# Patient Record
Sex: MALE | Race: WHITE | URBAN - METROPOLITAN AREA
[De-identification: names, ages, dates, MRNs, and addresses within clinical notes are randomized per-mention and may not be internally consistent; named-entity substitution may affect disease eponyms.]

---

## 2017-05-03 ENCOUNTER — OFFICE VISIT (OUTPATIENT)
Dept: FAMILY MEDICINE | Facility: CLINIC | Age: 72
End: 2017-05-03
Payer: COMMERCIAL

## 2017-05-03 VITALS
SYSTOLIC BLOOD PRESSURE: 111 MMHG | HEIGHT: 63 IN | DIASTOLIC BLOOD PRESSURE: 69 MMHG | BODY MASS INDEX: 28.93 KG/M2 | TEMPERATURE: 98.1 F | OXYGEN SATURATION: 96 % | HEART RATE: 56 BPM | WEIGHT: 163.3 LBS

## 2017-05-03 DIAGNOSIS — F03.90 DEMENTIA WITHOUT BEHAVIORAL DISTURBANCE, UNSPECIFIED DEMENTIA TYPE: Primary | ICD-10-CM

## 2017-05-03 PROCEDURE — 99214 OFFICE O/P EST MOD 30 MIN: CPT | Performed by: INTERNAL MEDICINE

## 2017-05-03 NOTE — NURSING NOTE
"Chief Complaint   Patient presents with     Memory Loss       Initial /69 (BP Location: Right arm, Patient Position: Chair, Cuff Size: Adult Regular)  Pulse 56  Temp 98.1  F (36.7  C) (Oral)  Ht 5' 2.5\" (1.588 m)  Wt 163 lb 4.8 oz (74.1 kg)  SpO2 96%  BMI 29.39 kg/m2 Estimated body mass index is 29.39 kg/(m^2) as calculated from the following:    Height as of this encounter: 5' 2.5\" (1.588 m).    Weight as of this encounter: 163 lb 4.8 oz (74.1 kg).  Medication Reconciliation: complete   Whiltey Oconnell MA  "

## 2017-05-03 NOTE — PATIENT INSTRUCTIONS
Consider speech therapy appointment - I will contact you on myRetehart  Follow up with me in about 6 months

## 2017-05-03 NOTE — MR AVS SNAPSHOT
After Visit Summary   5/3/2017    Micheal Campa    MRN: [id number]           Patient Information     Date Of Birth          1945        Visit Information        Provider Department      5/3/2017 3:30 PM Kady Chavez, DO [facility]        Today's Diagnoses     Dementia without behavioral disturbance, unspecified dementia type    -  1      Care Instructions    Consider speech therapy appointment - I will contact you on MyChart  Follow up with me in about 6 months        Follow-ups after your visit        Additional Services     SPEECH THERAPY REFERRAL       *This therapy referral will be filtered to a centralized scheduling office at [facility] and the patient will receive a call to schedule an appointment at a [facility] location most convenient for them. *     [facility] provides Speech Therapy evaluation and treatment and many specialty services across the [facility] system.  If requesting a specialty program, please choose from the list below.  If you have not heard from the scheduling office within 2 business days, please call [phone] for all locations, with the exception of [place], please call [phone].       Treatment: Evaluation & Treatment  Speech Treatment Diagnosis: Cognitive Deficits and word-finding difficulty  Special Instructions: Help with word finding  Special Programs: None    Please be aware that coverage of these services is subject to the terms and limitations of your health insurance plan.  Call member services at your health plan with any benefit or coverage questions.      **Note to Provider:  If you are referring outside of [facility] for the therapy appointment, please list the name of the location in the  special instructions  above, print the referral and give to the patient to schedule the appointment.                  Who to contact     If you have questions or need follow up information about today's clinic  "visit or your schedule please contact [facility] directly at [phone].  Normal or non-critical lab and imaging results will be communicated to you by MyChart, letter or phone within 4 business days after the clinic has received the results. If you do not hear from us within 7 days, please contact the clinic through [employer] or phone. If you have a critical or abnormal lab result, we will notify you by phone as soon as possible.  Submit refill requests through [employer] or call your pharmacy and they will forward the refill request to us. Please allow 3 business days for your refill to be completed.          Additional Information About Your Visit        [employer] Information     [employer] gives you secure access to your electronic health record. If you see a primary care provider, you can also send messages to your care team and make appointments. If you have questions, please call your primary care clinic.  If you do not have a primary care provider, please call [phone] and they will assist you.        Care EveryWhere ID     This is your Care EveryWhere ID. This could be used by other organizations to access your [facility] medical records  [id number]        Your Vitals Were     Pulse Temperature Height Pulse Oximetry BMI (Body Mass Index)       56 98.1  F (36.7  C) (Oral) 5' 2.5\" (1.588 m) 96% 29.39 kg/m2        Blood Pressure from Last 3 Encounters:   05/03/17 111/69   11/16/16 118/71   06/28/16 122/80    Weight from Last 3 Encounters:   05/03/17 163 lb 4.8 oz (74.1 kg)   11/16/16 163 lb (73.9 kg)   06/28/16 158 lb 12.8 oz (72 kg)              We Performed the Following     SPEECH THERAPY REFERRAL        Primary Care Provider Office Phone # Fax #    Christopher Ramirez [phone]       [facility] [address] [place] [id number]        Thank you!     Thank you for choosing [facility]  for your care. Our goal is always to provide you with excellent care. " Hearing back from our patients is one way we can continue to improve our services. Please take a few minutes to complete the written survey that you may receive in the mail after your visit with us. Thank you!             Your Updated Medication List - Protect others around you: Learn how to safely use, store and throw away your medicines at www.disposemymeds.org.          This list is accurate as of: 5/3/17  4:12 PM.  Always use your most recent med list.                   Brand Name Dispense Instructions for use    aspirin 81 MG tablet      1 TABLET DAILY       cholecalciferol 2000 UNITS tablet      Take 1 tablet by mouth daily.       cyabnocobalamin 2500 MCG sublingual tablet    VITAMIN B-12    90 tablet    Take 2,500 mcg by mouth daily.       donepezil 10 MG tablet    ARICEPT    90 tablet    Take 1 tablet (10 mg) by mouth At Bedtime       fish oil-omega-3 fatty acids 1000 MG capsule     180 capsule    Take 2 capsules by mouth daily.       memantine 10 MG tablet    NAMENDA    180 tablet    Take 1 tablet (10 mg) by mouth 2 times daily       pravastatin 40 MG tablet    PRAVACHOL    90 tablet    Take 1 tablet (40 mg) by mouth daily

## 2017-05-03 NOTE — PROGRESS NOTES
"MEMORY EVALUATION CLINIC - FOLLOWUP    INTERVAL HISTORY: Mino is here for f/u of his early onset dementia along with his wife Shu. Over the past 6 months things have been pretty stable. Currently on full dose Aricept and Namenda. Shu remarks that Mino is doing very well physically but she has concerns about increasing cognitive impairment. One night several months ago Mino did not recognize Shu and he told her that he wanted to go find his wife and grew somewhat upset about this. They had been in [place] and were driving home at night when this occurred so likely the unfamiliarity of it plus it being dusk might have worsened the situation--there has been no reoccurrences of this. Shu was able to redirect him and calm him by telling him \"I'll help you go find your wife\".     Joined [employer], they both really enjoy singing -- May 13 they have a concert planned. Shu is concerned that Mino is having some difficulty with word finding. Shu notices a very slow decline where he sometimes has to ask more questions and gets more confused as to who people are. Mino is still sleeping well, he does wake once during the night to use the restroom. No recent episodes of wandering--alarm on front door. Shu takes an hour to herself at night when Mino goes to sleep at 10PM and she stays up until 11PM and that works well for her to decompress. Their kids are also closely involved.    They have a trip planned for June to go to [facility] to see Mino's family -- he is excited to see his family. They will go with two sons and they are prepared in case he gets confused with new surroundings and time zones.    When you ask Mino about how things are going he just mentions he is excited for the trip and does not add much else. Shu is beginning to feel that she is having trouble finding things for Mino to do at home. She tries to get him out walking more as the weather improves. Shu will " "need assistance organizing paperwork for her son's FMLA paperwork to be able to get time off to be able to care for Mino.      ADLs: moderate assist with personal cares  Driving: None  Finances: Brandi manages finances  Shopping/housekeeping/meal prep: Brandi manages meal prep  Medications: tolerating full dose Aricept and Namenda well  Home situation: Condo home with spouse  Support: good social and family support, excellent care from spouse and children   Behaviors/Hallucinations/Delusions: None, though he has wandered in the past; now has alarm on front door      REVIEW OF SYSTEMS: Detailed as above     This document serves as a record of the services and decisions personally performed and made by Kady Chavez DO. It was created on his/her behalf by Tracie Rosas, a trained medical scribe. The creation of this document is based the provider's statements to the medical scribe.  Scribrupinder Rosas 4:12 PM, May 3, 2017    OBJECTIVE: /69 (BP Location: Right arm, Patient Position: Chair, Cuff Size: Adult Regular)  Pulse 56  Temp 98.1  F (36.7  C) (Oral)  Ht 5' 2.5\" (1.588 m)  Wt 163 lb 4.8 oz (74.1 kg)  SpO2 96%  BMI 29.39 kg/m2  Pleasant  Clean and groomed  Ambulates independently  Looks to his wife for answers because his answers are quite vague  He is thankful and appreciative and has sweet personality     ASSESSMENT/PLAN:   Mino is here in follow up of his dementia likely of the Alzheimer's type of early onset.    Family is very educated about dementia and very proactive. Brandi was able to re-direct Mino recently when he didn't realize that she was his wife. Emotionally Brandi is holding ok. Answered a lot of questions that his wife had. Guidance provided on upcoming trip. May benefit on speech therapy for word finding difficulty so referral was placed. Continue medications as is for added stability.     Patient Instructions   Consider speech therapy appointment - I will contact you on " Rehana  Follow up with me in about 6 months    MDM: 30 minutes spent with patient, over 50% time counseling, coordinating care and explaining about nature of the patient's conditions.    The information in this document, created by the medical scribe for me, accurately reflects the services I personally performed and the decisions made by me. I have reviewed and approved this document for accuracy prior to leaving the patient care area.  Kady Chavez DO  4:12 PM, 05/03/17    Kady Chavez DO  Internal Medicine and Geriatrics  [facility]

## 2017-05-15 ENCOUNTER — HOSPITAL ENCOUNTER (OUTPATIENT)
Dept: SPEECH THERAPY | Facility: CLINIC | Age: 72
Setting detail: THERAPIES SERIES
End: 2017-05-15
Attending: INTERNAL MEDICINE
Payer: COMMERCIAL

## 2017-05-15 PROCEDURE — 40000211 ZZHC STATISTIC SLP  DEPARTMENT VISIT: Performed by: SPEECH-LANGUAGE PATHOLOGIST

## 2017-05-15 PROCEDURE — G9162 LANG EXPRESS CURRENT STATUS: HCPCS | Mod: GN,CL | Performed by: SPEECH-LANGUAGE PATHOLOGIST

## 2017-05-15 PROCEDURE — 92523 SPEECH SOUND LANG COMPREHEN: CPT | Mod: GN,52 | Performed by: SPEECH-LANGUAGE PATHOLOGIST

## 2017-05-15 PROCEDURE — G9163 LANG EXPRESS GOAL STATUS: HCPCS | Mod: GN,CL | Performed by: SPEECH-LANGUAGE PATHOLOGIST

## 2017-05-15 NOTE — PROGRESS NOTES
[facility]          OUTPATIENT SPEECH LANGUAGE PATHOLOGY LANGUAGE-COGNITION  EVALUATION  PLAN OF TREATMENT FOR OUTPATIENT REHABILITATION  (COMPLETE FOR INITIAL CLAIMS ONLY)  Patient's Last Name, First Name, M.I.  YOB: 1945  LokeshMicheal  SHELLY                        Provider s Name: [facility] Medical Record No.  [id number]     Onset Date:  05/03/17 (Order date, diagnosed 2011)   Start of Care Date: 05/15/17   Type:     ___PT  __OT   _X_SLP    Medical Diagnosis:  Dementia without behavioral disturbance, unspecified dementia type (F03.90)   Speech Language Pathology Diagnosis:  Moderate-severe expressive and receptive language impairment    Visits from SOC: 1                                        ________________________________________________________________________________  Plan of Treatment/Functional Goals:   Planned Therapy Interventions: Language        Language / Cognition Goals  1.         Goal Description: Patient and wife will demonstrate use of cueing strategies to support verbal expression with min assist across 4/5 communication breakdowns to improve patient's verbal expression for everyday speaking tasks.       Target Date: 08/03/17   2.         Goal Description: Patient and family will initiate home program to address declining language skills and enhance patient's communication environment with min assist.        Target Date: 08/03/17   3.         Goal Description: Patient will utilize a communication support tool (e.g. binder or booklet) to answer questions and comment on a topic of his choice (e.g. shopping, family, vacation) in 4/5 opportunities when given mod assist.        Target Date: 08/03/17            Predicted Duration of Therapy Intervention (days/wks): 6-8 weeks    Marcia Jesus, SLP       I CERTIFY THE NEED FOR THESE SERVICES FURNISHED UNDER         THIS PLAN OF TREATMENT AND WHILE UNDER MY CARE     (Physician co-signature of this document indicates review and certification of the therapy plan).                  Certification Date From:  05/15/17  Certification Date To:   08/03/17          Referring Physician:  Dr. Kady Chavez    Initial Assessment        See Epic Evaluation Start Of Care Date: 05/15/17

## 2017-05-15 NOTE — PROGRESS NOTES
[facility] Outpatient Speech-Language Evaluation     05/15/17 7605   General Information   Type of Evaluation Speech and Language   Type Of Visit Initial   Start Of Care Date 05/15/17   Referring Physician Dr. Kady Chavez   Orders Evaluate And Treat   Orders Comment Cognitive deficits and word-finding difficulty, help with word-finding   Medical Diagnosis Dementia without behavioral disturbance, unspecified dementia type (F03.90)   Onset Of Illness/injury Or Date Of Surgery 05/03/17  (Order date, diagnosed 2011)   Surgical/Medical history reviewed Yes   Pertinent History Of Current Problem Mr. Campa is a pleasant 71-year-old male patient with daignosis of dementia in 2011 referred to SLP services after family c/o increased difficulty with word-finding in daily conversation. Patient has never had formal speech therapy services before.     Current Community Support  Family/friend caregiver   Patient Role/employment History Retired  (former employee of [employer])   Living environment Apartment/condo   General Observations Patient able to answer some questions independently, but information was not accurate (per wife).    Patient/family Goals To improve word-finding and conversation ability   Oral Motor Sensory Function   Comments Did not assess.   Language: Auditory Comprehension (understanding of spoken language)   Tests were administered at the following levels Moderate (routine daily activities)   Two Step Commands (out of 5 total) 3   Functional Assessment Scale (Auditory Comprehension) Moderate to Severe Impairment   Comments (Auditory Comprehension) Reduced ability to follow 1 and 2 step commands, memory impairment likely contributing at longer listening task level.    Language: Verbal Expression (use of spoken language to express information)   Tests were administered at the following levels Basic (rote activities)   [place] Naming Test, short form (out of 15 total) 2   Generative Naming Score;  Cognitive Linguistic Quick Test 2   Generative Naming; Cognitive Linguistic Quick Test Result Below mean   Functional Assessment Scale (Verbal Expression) Moderate to Severe Impairment   Comments (Verbal Expression) Verbal expression is reduced in complexity, key nouns missing, speech is vague and brief in utterance   Reading Comprehension (understanding of written language)   Tests were administered at the following levels Basic (rote activities)   Picture - Word Match; [place] Diagnostic Aphasia Exam 3 (out of 10 total) 5   Simple Phrase/Sentence (out of 15 total) 2   Functional Assessment Scale (Reading Comprehension) Moderate to Severe Impairment   Comments (Reading Comprehension) Patient able to read at around 50% accy the single word level   Written Expression (use of writing to express information)   Tests were administered at the following levels Basic (rote activities)   Functional Assessment Scale (Written Expression) Moderate to Severe Impairment   Comments (Written Expression) Patient wrote his name with spelling errors, unable to write the alphabet, was able to write 5/5 alphabet letters from verbal cues, 3/5 numbers from verbal cues, and 0/5 simple words from verbal cues.    Cognitive Status Examination   Short Term Memory impaired   Cognitive Status Exam Comments did not assess cognition. Pt has history of dementia since 2011.    Education Assessment   Barriers to Learning Cognitive   Preferred Learning Style Listening;Reading;Demonstration;Pictures/video   General Therapy Interventions   Planned Therapy Interventions Language   Language Verbal expression;Auditory comprehension   Clinical Impression, SLP Eval   Criteria for Skilled Therapeutic Interventions Met yes;treatment indicated   SLP Diagnosis Moderate-severe expressive and receptive language impairment   Functional limitations due to impairments Patient unable to identify objects and find things in his home with verbal prompts alone, unable to  fully participate in conversation with family and friends   Rehab potential affected by progressive diagnosis   Therapy Frequency 1 time;per week   Predicted Duration of Therapy Intervention (days/wks) 6 weeks   Risks and Benefits of Treatment have been explained. Yes   Patient, Family & other staff in agreement with plan of care Yes   Clinical Impression Comments Mr. Burton presents with moderate-severe impairment to expressive and receptive language skills secondary to dementia. Verbal expression significantly reduced for confrontational and generative naming tasks. Conversation is mostly void of content words, and instead is short in utterance with vague language and simple structure. Patient is able to follow simple commands and identify simple objects most of the time from verbal cues. Patient is able to read at the single word level approximately 50% of the time. Written expression is limited to his name, 1-2 digit numbers, and single letters. RECOMMEND: Patient and family will benefit from a brief course of skilled intervention to train communication strategies and assistive communication tools to facilitate expression and comprehension skills in home and community settings. They may also benefit from training in a home exercise program to stimulate language and help slow progression of language disorder.     Language/Cognition Goals   Language/Cognition Goals 1;2;3   Language/Cognition Goal 1   Goal Description Patient and wife will demonstrate use of verbal or visual strategies to support verbal expression with min assist across 4/5 communication breakdowns to improve patient's verbal expression for everyday speaking tasks.   Target Date 08/03/17   Language/Cognition Goal 2   Goal Description Patient and family will initiate home program to address declining language skills and enhance patient's communication environment with min assist.    Target Date 08/03/17   Language/Cognition Goal 3   Goal Description  Patient will utilize a communication support tool (e.g. [employer] binder or booklet) to answer questions and respond to a topic (e.g. shopping, family, vacation) in 4/5 opportunities when given mod assist to increase comprehension for daily language tasks.    Target Date 08/03/17   Total Session Time   Total Evaluation Time 35   Therapy Certification   Certification date from 05/15/17   Certification date to 08/03/17   Medical Diagnosis Dementia without behavioral disturbance, unspecified dementia type (F03.90)   SLP Medicare Only G-code   G-code Spoken Language Expression   Spoken Language Expression   Spoken Language Expression: Current Status , Goal , [place] -Wmkp Only-Modifier the same for all G-codes CL: 60-79% impairment   Spoken Language Expression Comments Per clinical evaluation, patient report     Thank you for your referral of this patient.      Marcia Jesus MA, CCC-SLP  Speech-Language Pathology  [facility]  Phone: [phone]  Pager: [phone]

## 2017-05-22 ENCOUNTER — HOSPITAL ENCOUNTER (OUTPATIENT)
Dept: SPEECH THERAPY | Facility: CLINIC | Age: 72
Setting detail: THERAPIES SERIES
End: 2017-05-22
Attending: INTERNAL MEDICINE
Payer: COMMERCIAL

## 2017-05-22 PROCEDURE — 40000211 ZZHC STATISTIC SLP  DEPARTMENT VISIT: Performed by: SPEECH-LANGUAGE PATHOLOGIST

## 2017-05-22 PROCEDURE — 92507 TX SP LANG VOICE COMM INDIV: CPT | Mod: GN | Performed by: SPEECH-LANGUAGE PATHOLOGIST

## 2017-05-31 ENCOUNTER — HOSPITAL ENCOUNTER (OUTPATIENT)
Dept: SPEECH THERAPY | Facility: CLINIC | Age: 72
Setting detail: THERAPIES SERIES
End: 2017-05-31
Attending: INTERNAL MEDICINE
Payer: COMMERCIAL

## 2017-05-31 PROCEDURE — 92507 TX SP LANG VOICE COMM INDIV: CPT | Mod: GN | Performed by: SPEECH-LANGUAGE PATHOLOGIST

## 2017-05-31 PROCEDURE — 40000211 ZZHC STATISTIC SLP  DEPARTMENT VISIT: Performed by: SPEECH-LANGUAGE PATHOLOGIST

## 2017-06-05 ENCOUNTER — HOSPITAL ENCOUNTER (OUTPATIENT)
Dept: SPEECH THERAPY | Facility: CLINIC | Age: 72
Setting detail: THERAPIES SERIES
End: 2017-06-05
Attending: INTERNAL MEDICINE
Payer: COMMERCIAL

## 2017-06-05 PROCEDURE — 92507 TX SP LANG VOICE COMM INDIV: CPT | Mod: GN | Performed by: SPEECH-LANGUAGE PATHOLOGIST

## 2017-06-05 PROCEDURE — 40000211 ZZHC STATISTIC SLP  DEPARTMENT VISIT: Performed by: SPEECH-LANGUAGE PATHOLOGIST

## 2017-06-12 ENCOUNTER — HOSPITAL ENCOUNTER (OUTPATIENT)
Dept: SPEECH THERAPY | Facility: CLINIC | Age: 72
Setting detail: THERAPIES SERIES
End: 2017-06-12
Attending: INTERNAL MEDICINE
Payer: COMMERCIAL

## 2017-06-12 PROCEDURE — 40000211 ZZHC STATISTIC SLP  DEPARTMENT VISIT: Performed by: SPEECH-LANGUAGE PATHOLOGIST

## 2017-06-12 PROCEDURE — 92507 TX SP LANG VOICE COMM INDIV: CPT | Mod: GN | Performed by: SPEECH-LANGUAGE PATHOLOGIST

## 2017-06-19 ENCOUNTER — HOSPITAL ENCOUNTER (OUTPATIENT)
Dept: SPEECH THERAPY | Facility: CLINIC | Age: 72
Setting detail: THERAPIES SERIES
End: 2017-06-19
Attending: INTERNAL MEDICINE
Payer: COMMERCIAL

## 2017-06-19 PROCEDURE — 40000211 ZZHC STATISTIC SLP  DEPARTMENT VISIT: Performed by: SPEECH-LANGUAGE PATHOLOGIST

## 2017-06-19 PROCEDURE — 92507 TX SP LANG VOICE COMM INDIV: CPT | Mod: GN | Performed by: SPEECH-LANGUAGE PATHOLOGIST

## 2017-06-29 ENCOUNTER — HOSPITAL ENCOUNTER (OUTPATIENT)
Dept: SPEECH THERAPY | Facility: CLINIC | Age: 72
Setting detail: THERAPIES SERIES
End: 2017-06-29
Attending: INTERNAL MEDICINE
Payer: COMMERCIAL

## 2017-06-29 PROCEDURE — 40000211 ZZHC STATISTIC SLP  DEPARTMENT VISIT: Performed by: SPEECH-LANGUAGE PATHOLOGIST

## 2017-06-29 PROCEDURE — G9164 LANG EXPRESS D/C STATUS: HCPCS | Mod: GN,CL | Performed by: SPEECH-LANGUAGE PATHOLOGIST

## 2017-06-29 PROCEDURE — G9163 LANG EXPRESS GOAL STATUS: HCPCS | Mod: GN,CL | Performed by: SPEECH-LANGUAGE PATHOLOGIST

## 2017-06-29 PROCEDURE — 92507 TX SP LANG VOICE COMM INDIV: CPT | Mod: GN | Performed by: SPEECH-LANGUAGE PATHOLOGIST

## 2017-06-29 NOTE — PROGRESS NOTES
"Outpatient Speech Language Pathology Discharge Note     Patient: Micheal Campa  : 1945    Beginning/End Dates of Reporting Period:  5/15/17 to 2017, 6 visits total    Referring Provider: Dr. Kady Chavez    Therapy Diagnosis: moderately severe expressive and receptive language impairment    Client Self Report: Pt and wife report that they had a very nice trip to [facility]. Pt's wife sharing that patient did well on the trip and they had no major concerns/communication issues with his family in [facility]. She reports she feels the treatment they received has been \"helpful.\"     Objective Measurements: See below:      Goals:  Goal Identifier     Goal Description Patient and wife will demonstrate use of cueing strategies to support verbal expression with min assist across 4/5 communication breakdowns to improve patient's verbal expression for everyday speaking tasks.   Target Date 17   Date Met  17   Progress: Goal met and dismissed.  Patient's wife verbalized understanding and demonstrated use of strategies to support word-finding during communication breakdowns with min cues.      Goal Identifier     Goal Description Patient and family will initiate home program to address declining language skills and enhance patient's communication environment with min assist.    Target Date 17   Date Met  17   Progress: Goal met and dismissed. Pt and spouse and working together on home program materials and verbalize understanding of appropriate language activities to address word-finding skills in home setting.      Goal Identifier     Goal Description Patient will utilize a communication support tool (e.g. binder or booklet) to answer questions and comment on a topic of his choice (e.g. shopping, family, vacation) in 4/5 opportunities when given mod assist.    Target Date 17   Date Met  17   Progress: Goal met. Patient was able to name family members he saw and visited with on his " recent trip to [facility] using photos and labels from his communication binder in 4/5 opportunities with mod cues. Patient and his wife are utilizing the communication binder in home setting both for home program to address language skills and also when communication repairs are needed.     Progress Toward Goals:   Progress this reporting period: Good.  Patient and spouse have received education and demonstrated use of it to support Mino's communication attempts and comprehension in their home and community settings. Pt's spouse verbalizes understanding of ongoing strategies to support successful communication into the future as needs progress. They have demonstrated use of a communication binder to support conversation, have added written and visual labels to areas throughout the home as needed, and set up a home program for practice of language skills. They deny further needs at this time.     Plan: Discharge from therapy.    Discharge:  Yes.     Reason for Discharge: Patient has met all goals.    Discharge Plan: No further skilled speech therapy intervention at this time.     Thank you for your referral of this patient. It was a pleasure working with Mr. Campa and his wife on the patient's treatment goals.       Marcia Jesus MA, CCC-SLP  Speech-Language Pathology  [facility]  Phone: [phone]  Pager: [phone]

## 2017-07-02 DIAGNOSIS — R41.3 SHORT-TERM MEMORY LOSS: ICD-10-CM

## 2017-07-05 RX ORDER — DONEPEZIL HYDROCHLORIDE 10 MG/1
TABLET, FILM COATED ORAL
Qty: 90 TABLET | Refills: 3 | Status: SHIPPED | OUTPATIENT
Start: 2017-07-05 | End: 2018-06-21

## 2017-07-05 RX ORDER — MEMANTINE HYDROCHLORIDE 10 MG/1
TABLET ORAL
Qty: 180 TABLET | Refills: 3 | Status: SHIPPED | OUTPATIENT
Start: 2017-07-05 | End: 2018-06-21

## 2017-07-05 NOTE — TELEPHONE ENCOUNTER
memantine (NAMENDA) 10 MG tablet      Last Written Prescription Date: 6/28/16  Last Fill Quantity: 180,  # refills: 3   Last Office Visit with [facility] or [facility] prescribing provider: 5/3/17                                             donepezil (ARICEPT) 10 MG tablet      Last Written Prescription Date: 6/28/16  Last Fill Quantity: 90,  # refills: 3   Last Office Visit with [facility] or [facility] prescribing provider: 5/3/17

## 2017-07-14 ENCOUNTER — MYC MEDICAL ADVICE (OUTPATIENT)
Dept: INTERNAL MEDICINE | Facility: CLINIC | Age: 72
End: 2017-07-14

## 2017-07-15 ENCOUNTER — HEALTH MAINTENANCE LETTER (OUTPATIENT)
Age: 72
End: 2017-07-15

## 2017-07-24 ENCOUNTER — OFFICE VISIT (OUTPATIENT)
Dept: INTERNAL MEDICINE | Facility: CLINIC | Age: 72
End: 2017-07-24
Payer: COMMERCIAL

## 2017-07-24 VITALS
HEIGHT: 63 IN | SYSTOLIC BLOOD PRESSURE: 106 MMHG | HEART RATE: 69 BPM | WEIGHT: 158.7 LBS | BODY MASS INDEX: 28.12 KG/M2 | TEMPERATURE: 97.5 F | OXYGEN SATURATION: 98 % | DIASTOLIC BLOOD PRESSURE: 60 MMHG

## 2017-07-24 DIAGNOSIS — Z12.11 COLON CANCER SCREENING: ICD-10-CM

## 2017-07-24 DIAGNOSIS — Z12.5 SPECIAL SCREENING FOR MALIGNANT NEOPLASM OF PROSTATE: ICD-10-CM

## 2017-07-24 DIAGNOSIS — E55.9 VITAMIN D DEFICIENCY: ICD-10-CM

## 2017-07-24 DIAGNOSIS — F03.90 DEMENTIA WITHOUT BEHAVIORAL DISTURBANCE, UNSPECIFIED DEMENTIA TYPE: ICD-10-CM

## 2017-07-24 DIAGNOSIS — E78.5 HYPERLIPIDEMIA LDL GOAL <130: ICD-10-CM

## 2017-07-24 DIAGNOSIS — Z00.00 MEDICARE ANNUAL WELLNESS VISIT, SUBSEQUENT: Primary | ICD-10-CM

## 2017-07-24 DIAGNOSIS — Z11.59 NEED FOR HEPATITIS C SCREENING TEST: ICD-10-CM

## 2017-07-24 LAB
ALBUMIN SERPL-MCNC: 4 G/DL (ref 3.4–5)
ALP SERPL-CCNC: 50 U/L (ref 40–150)
ALT SERPL W P-5'-P-CCNC: 34 U/L (ref 0–70)
ANION GAP SERPL CALCULATED.3IONS-SCNC: 4 MMOL/L (ref 3–14)
AST SERPL W P-5'-P-CCNC: 29 U/L (ref 0–45)
BILIRUB SERPL-MCNC: 3.8 MG/DL (ref 0.2–1.3)
BUN SERPL-MCNC: 14 MG/DL (ref 7–30)
CALCIUM SERPL-MCNC: 9.6 MG/DL (ref 8.5–10.1)
CHLORIDE SERPL-SCNC: 106 MMOL/L (ref 94–109)
CHOLEST SERPL-MCNC: 209 MG/DL
CO2 SERPL-SCNC: 30 MMOL/L (ref 20–32)
CREAT SERPL-MCNC: 0.89 MG/DL (ref 0.66–1.25)
GFR SERPL CREATININE-BSD FRML MDRD: 84 ML/MIN/1.7M2
GLUCOSE SERPL-MCNC: 88 MG/DL (ref 70–99)
HDLC SERPL-MCNC: 83 MG/DL
HGB BLD-MCNC: 16 G/DL (ref 13.3–17.7)
LDLC SERPL CALC-MCNC: 115 MG/DL
NONHDLC SERPL-MCNC: 126 MG/DL
POTASSIUM SERPL-SCNC: 4 MMOL/L (ref 3.4–5.3)
PROT SERPL-MCNC: 7.8 G/DL (ref 6.8–8.8)
PSA SERPL-ACNC: 0.18 UG/L (ref 0–4)
SODIUM SERPL-SCNC: 140 MMOL/L (ref 133–144)
TRIGL SERPL-MCNC: 55 MG/DL

## 2017-07-24 PROCEDURE — 85018 HEMOGLOBIN: CPT | Performed by: INTERNAL MEDICINE

## 2017-07-24 PROCEDURE — 86803 HEPATITIS C AB TEST: CPT | Performed by: INTERNAL MEDICINE

## 2017-07-24 PROCEDURE — 36415 COLL VENOUS BLD VENIPUNCTURE: CPT | Performed by: INTERNAL MEDICINE

## 2017-07-24 PROCEDURE — G0103 PSA SCREENING: HCPCS | Performed by: INTERNAL MEDICINE

## 2017-07-24 PROCEDURE — 82306 VITAMIN D 25 HYDROXY: CPT | Performed by: INTERNAL MEDICINE

## 2017-07-24 PROCEDURE — 80053 COMPREHEN METABOLIC PANEL: CPT | Performed by: INTERNAL MEDICINE

## 2017-07-24 PROCEDURE — 99397 PER PM REEVAL EST PAT 65+ YR: CPT | Performed by: INTERNAL MEDICINE

## 2017-07-24 PROCEDURE — 80061 LIPID PANEL: CPT | Performed by: INTERNAL MEDICINE

## 2017-07-24 RX ORDER — PRAVASTATIN SODIUM 40 MG
40 TABLET ORAL DAILY
Qty: 90 TABLET | Refills: 3 | Status: SHIPPED | OUTPATIENT
Start: 2017-07-24 | End: 2018-07-25

## 2017-07-24 NOTE — MR AVS SNAPSHOT
After Visit Summary   7/24/2017    Micheal Campa    MRN: [id number]           Patient Information     Date Of Birth          1945        Visit Information        Provider Department      7/24/2017 11:40 AM Christopher Ramirez MD [facility]        Today's Diagnoses     Medicare annual wellness visit, subsequent    -  1    Dementia without behavioral disturbance, unspecified dementia type        Vitamin D deficiency        Hyperlipidemia LDL goal <130        Special screening for malignant neoplasm of prostate        Colon cancer screening        Need for hepatitis C screening test           Follow-ups after your visit        Additional Services     GASTROENTEROLOGY ADULT REF PROCEDURE ONLY       Last Lab Result: Creatinine (mg/dL)       Date                     Value                 08/09/2016               0.83             ----------  There is no height or weight on file to calculate BMI.     Needed:  No  Language:  English    Patient will be contacted to schedule procedure.     Please be aware that coverage of these services is subject to the terms and limitations of your health insurance plan.  Call member services at your health plan with any benefit or coverage questions.  Any procedures must be performed at a [facility] facility OR coordinated by your clinic's referral office.    Please bring the following with you to your appointment:    (1) Any X-Rays, CTs or MRIs which have been performed.  Contact the facility where they were done to arrange for  prior to your scheduled appointment.    (2) List of current medications   (3) This referral request   (4) Any documents/labs given to you for this referral                  Who to contact     If you have questions or need follow up information about today's clinic visit or your schedule please contact [facility] directly at [phone].  Normal or non-critical lab and imaging results  "will be communicated to you by MyChart, letter or phone within 4 business days after the clinic has received the results. If you do not hear from us within 7 days, please contact the clinic through Zinit[employer] or phone. If you have a critical or abnormal lab result, we will notify you by phone as soon as possible.  Submit refill requests through Zinitix or call your pharmacy and they will forward the refill request to us. Please allow 3 business days for your refill to be completed.          Additional Information About Your Visit        Zinitix Information     Z[employer]ix gives you secure access to your electronic health record. If you see a primary care provider, you can also send messages to your care team and make appointments. If you have questions, please call your primary care clinic.  If you do not have a primary care provider, please call [phone] and they will assist you.        Care EveryWhere ID     This is your Care EveryWhere ID. This could be used by other organizations to access your [facility] medical records  [id number]        Your Vitals Were     Pulse Temperature Height Pulse Oximetry BMI (Body Mass Index)       69 97.5  F (36.4  C) (Oral) 5' 2.5\" (1.588 m) 98% 28.56 kg/m2        Blood Pressure from Last 3 Encounters:   07/24/17 106/60   05/03/17 111/69   11/16/16 118/71    Weight from Last 3 Encounters:   07/24/17 158 lb 11.2 oz (72 kg)   05/03/17 163 lb 4.8 oz (74.1 kg)   11/16/16 163 lb (73.9 kg)              We Performed the Following     Comprehensive metabolic panel     GASTROENTEROLOGY ADULT REF PROCEDURE ONLY     Hemoglobin     Hepatitis C antibody     Lipid Profile     PSA, screen     Vitamin D Deficiency          Where to get your medicines      These medications were sent [place] [address] [place] [facility] [address]  [address] [facility], [facility] [address]     Phone:  [phone]     pravastatin 40 MG tablet          Primary Care Provider Office Phone # Fax #    Christopher R " MD [place] [phone]       [facility] [address] [facility] [address]        Equal Access to Services     CHUCKIE KRAMER : Hadii aad ku hadbing Gonzalez, wajasielda luqadaha, qaybta kaalmada adehollida, jesse yoon austinmatt alexander lajosekevin meng. So [facility] [phone].    ATENCIÓN: Si habla español, tiene a robertson disposición servicios gratuitos de asistencia lingüística. Llame al [phone].    We comply with applicable federal civil rights laws and [place] laws. We do not discriminate on the basis of race, color, national origin, age, disability sex, sexual orientation or gender identity.            Thank you!     Thank you for choosing [facility]  for your care. Our goal is always to provide you with excellent care. Hearing back from our patients is one way we can continue to improve our services. Please take a few minutes to complete the written survey that you may receive in the mail after your visit with us. Thank you!             Your Updated Medication List - Protect others around you: Learn how to safely use, store and throw away your medicines at www.disposemymeds.org.          This list is accurate as of: 7/24/17 12:02 PM.  Always use your most recent med list.                   Brand Name Dispense Instructions for use Diagnosis    aspirin 81 MG tablet      1 TABLET DAILY    Routine general medical examination at a health care facility, Other and unspecified hyperlipidemia       cholecalciferol 2000 UNITS tablet      Take 1 tablet by mouth daily.    Vitamin D deficiency       cyabnocobalamin 2500 MCG sublingual tablet    VITAMIN B-12    90 tablet    Take 2,500 mcg by mouth daily.    Short-term memory loss       donepezil 10 MG tablet    ARICEPT    90 tablet    TAKE 1 TABLET BY MOUTH AT BEDTIME    Short-term memory loss       fish oil-omega-3 fatty acids 1000 MG capsule     180 capsule    Take 2 capsules by mouth daily.    Short-term memory loss,  Hyperlipidemia LDL goal <130       memantine 10 MG tablet    NAMENDA    180 tablet    TAKE 1 TABLET (10 MG) BY MOUTH 2 TIMES DAILY    Short-term memory loss       pravastatin 40 MG tablet    PRAVACHOL    90 tablet    Take 1 tablet (40 mg) by mouth daily    Hyperlipidemia LDL goal <130

## 2017-07-24 NOTE — PROGRESS NOTES
SUBJECTIVE:      Micheal Campa is a 71 year old male who presents for Preventive Visit.     Are you in the first 12 months of your Medicare Part B coverage?  No     COGNITIVE SCREENING:  Not appropriate due to known dementia        Other concerns to address:  Forms to be completed- [employer] and FMLA          All Histories reviewed and updated in EPIC as appropriate.         History   Substance Use Topics     Smoking status: Never Smoker      Smokeless tobacco: Never Used     Alcohol Use: 0.0 oz/week       0 Standard drinks or equivalent per week         Comment: rare         The patient does not drink >3 drinks per day nor >7 drinks per week.     Today's PHQ-2 Score:   PHQ-2 ( 1999 Pfizer) 6/25/2016 12/9/2015   Q1: Little interest or pleasure in doing things - 0   Q2: Feeling down, depressed or hopeless - 0   PHQ-2 Score - 0   Little interest or pleasure in doing things Not at all -   Feeling down, depressed or hopeless Several days -   PHQ-2 Score 1 -         Do you feel safe in your environment - Yes     Do you have a Health Care Directive?: Yes: Advance Directive has been received and scanned.     Current providers sharing in care for this patient include:   Patient Care Team:  Christopher Ramirez MD as PCP - General (Internal Medicine)       Hearing impairment: No    Ability to successfully perform activities of daily living: No, needs assistance with: transportation, medications and managing money     Fall risk:       Home safety:  none identified       The following health maintenance items are reviewed in Epic and correct as of today:       Health Maintenance   Topic Date Due     HEPATITIS C SCREENING  12/17/1964     AORTIC ANEURYSM SCREENING (SYSTEM ASSIGNED)  12/17/2011     LIPID MONITORING Q1 YEAR( NO INBASKET)  07/09/2016     INFLUENZA VACCINE (SYSTEM ASSIGNED)  09/01/2016     FALL RISK ASSESSMENT  12/09/2016     COLONOSCOPY Q10 YR INBASKET MESSAGE  04/27/2017     ADVANCE DIRECTIVE PLANNING Q5 YRS (NO  [email or web address]  12/17/2020     TETANUS IMMUNIZATION (SYSTEM ASSIGNED)  06/27/2023     PNEUMOCOCCAL  Completed                 Immunization History   Administered Date(s) Administered     Hepatitis A 02/18/1998, 01/06/2014, 07/01/2014     Influenza (High Dose) 3 valent vaccine 10/01/2015     Influenza (IIV3) 10/07/2010, 09/01/2012, 09/15/2013     Pneumococcal (PCV 13) 12/09/2015     Pneumococcal 23 valent 11/05/2012     TD (ADULT, 7+) 05/25/2004     TDAP (ADACEL AGES 11-64) 06/27/2013     Zoster vaccine, live 11/06/2012         ROS:  C: NEGATIVE for fever, chills, change in weight  E/M: NEGATIVE for ear, mouth and throat problems  R: NEGATIVE for significant cough or SOB  CV: NEGATIVE for chest pain, palpitations or peripheral edema  GI: NEGATIVE for nausea, abdominal pain, heartburn, or change in bowel habits  : NEGATIVE for frequency, dysuria, or hematuria  M: NEGATIVE for significant arthralgias or myalgia  N: NEGATIVE for weakness, dizziness or paresthesias  NEURO: + baseline memory changes  H: NEGATIVE for bleeding problems  P: NEGATIVE for changes in mood or affect     OBJECTIVE:      /60  Pulse 69  Temp 97.5  F (36.4  C) (Oral)  Ht 5' 2.5\" (1.588 m)  Wt 158 lb 11.2 oz (72 kg)  SpO2 98%  BMI 28.56 kg/m2    EXAM:   GENERAL: alert and no distress  EYES: Eyes grossly normal to inspection, PERRL and conjunctivae and sclerae normal  HENT: ear canals and TM's normal, nose and mouth without ulcers or lesions  NECK: no adenopathy, no asymmetry, masses, or scars and thyroid normal to palpation  RESP: lungs clear to auscultation - no rales, rhonchi or wheezes  CV: regular rate and rhythm, normal S1 S2, no S3 or S4, no click or rub, no peripheral edema and peripheral pulses strong  ABDOMEN: soft, nontender, no hepatosplenomegaly, no masses and bowel sounds normal  MS: no gross musculoskeletal defects noted  Mild dystrophic nails noted bilateral feet.  NEURO: mild baseline short term memory changes  PSYCH: " mentation appears normal, affect normal/bright     ASSESSMENT / PLAN:     (Z00.00) Medicare annual wellness visit, subsequent  (primary encounter diagnosis)  Comment: as ordered  Plan: Hemoglobin, Comprehensive metabolic panel            (F03.90) Dementia without behavioral disturbance, unspecified dementia type  Comment: on therapy and will be following up with Dr. Shelton Bazan  Plan: Comprehensive metabolic panel              (E55.9) Vitamin D deficiency  Comment: labs as ordered recehck  Plan: Vitamin D Deficiency            (E78.5) Hyperlipidemia LDL goal <130  Comment: labs fasting, meds refeilled  Plan: Lipid Profile, pravastatin (PRAVACHOL) 40 MG         tablet            (Z12.5) Special screening for malignant neoplasm of prostate  Comment: ordered as screening  Plan: PSA, screen            (Z12.11) Colon cancer screening  Comment: ADVISED COLONOSCOPY FOR ROUTINE PREVENTATIVE CARE.  Plan: GASTROENTEROLOGY ADULT REF PROCEDURE ONLY            (Z11.59) Need for hepatitis C screening test  Comment: as screening  Plan: Hepatitis C antibody            THE MEDICATION LIST HAS BEEN FULLY RECONCILED BY THE M.VASU. AND THE NURSING STAFF.

## 2017-07-24 NOTE — LETTER
[facility]  [address]  [address]      July 25, 2017      Micheal Campa  [address]    [facility] [address]          Dear Micheal,      I have enclosed a copy of your most recent labs done here at the clinic and if available some of your prior labs for comparison.     I am pleased to inform you that your routine blood work including your hemoglobin, sodium, potassium, calcium and kidney function tests are all stable.    Your cholesterol looks stable although your LDL level is slightly high and I would not change anything at this point but would repeat your labs in 6 months.    Your Hepatitis C and vitamin D function test is also normal.    One of your liver function tests is slightly abnormal but stable and should be rechecked here in the clinic in 6 months with a follow-up visit with me.  I will look forward to seeing you at that time and please call to make an appointment.    In addition, your PSA or prostate screening antigen is normal and should be repeated annually.    Please call me if you have further questions.        Christopher Ramirez MD

## 2017-07-24 NOTE — NURSING NOTE
"Chief Complaint   Patient presents with     Wellness Visit       Initial /60  Pulse (!) 49  Temp 97.5  F (36.4  C) (Oral)  Ht 5' 2.5\" (1.588 m)  Wt 158 lb 11.2 oz (72 kg)  SpO2 98%  BMI 28.56 kg/m2 Estimated body mass index is 28.56 kg/(m^2) as calculated from the following:    Height as of this encounter: 5' 2.5\" (1.588 m).    Weight as of this encounter: 158 lb 11.2 oz (72 kg).  Medication Reconciliation: complete   Rossi Foote, PAT      "

## 2017-07-25 LAB
DEPRECATED CALCIDIOL+CALCIFEROL SERPL-MC: 40 UG/L (ref 20–75)
HCV AB SERPL QL IA: NORMAL

## 2017-08-23 ENCOUNTER — MYC MEDICAL ADVICE (OUTPATIENT)
Dept: INTERNAL MEDICINE | Facility: CLINIC | Age: 72
End: 2017-08-23

## 2017-08-30 ENCOUNTER — OFFICE VISIT (OUTPATIENT)
Dept: INTERNAL MEDICINE | Facility: CLINIC | Age: 72
End: 2017-08-30
Payer: COMMERCIAL

## 2017-08-30 VITALS
TEMPERATURE: 98.7 F | WEIGHT: 164 LBS | HEART RATE: 61 BPM | SYSTOLIC BLOOD PRESSURE: 106 MMHG | BODY MASS INDEX: 29.52 KG/M2 | DIASTOLIC BLOOD PRESSURE: 66 MMHG | OXYGEN SATURATION: 95 %

## 2017-08-30 DIAGNOSIS — E78.5 HYPERLIPIDEMIA LDL GOAL <130: ICD-10-CM

## 2017-08-30 DIAGNOSIS — F03.90 DEMENTIA WITHOUT BEHAVIORAL DISTURBANCE, UNSPECIFIED DEMENTIA TYPE: Primary | ICD-10-CM

## 2017-08-30 PROCEDURE — 99213 OFFICE O/P EST LOW 20 MIN: CPT | Performed by: INTERNAL MEDICINE

## 2017-08-30 NOTE — PROGRESS NOTES
SUBJECTIVE:   Micheal Campa is a 71 year old male who presents to clinic today for the following health issues:    Mino is applying for enrollment in [employer].  He is required to have a [facility] or Chest x-ray before admission. Patient also needs annual medical exam report filled out from information from 7/24/17 medicare exam     Problem list and histories reviewed & adjusted, as indicated.  Additional history: as documented    Patient Active Problem List   Diagnosis     Diverticulosis of large intestine without diverticulitis     Vitamin D deficiency     HYPERLIPIDEMIA LDL GOAL <130     ACP (advance care planning)     Health Care Home     Dementia without behavioral disturbance, unspecified dementia type     Elevated bilirubin     Past Surgical History:   Procedure Laterality Date     ARTHROSCOPY KNEE RT/LT  12/04    torn right knee meniscus repair via arthroscopic surgery     HC COLON AIR CONTRAST  11/99    ACBE - negative  @[facility]     HC COLONOSCOPY THRU STOMA, DIAGNOSTIC  4/27/07    normal colonoscopy except for incidental diverticulosis       Social History   Substance Use Topics     Smoking status: Never Smoker     Smokeless tobacco: Never Used     Alcohol use 0.0 oz/week     0 Standard drinks or equivalent per week      Comment: rare     Family History   Problem Relation Age of Onset     CANCER Mother      D:73 cervical cancer     Neurologic Disorder Father      D:83 parkinsons disease     Family History Negative Sister          Current Outpatient Prescriptions   Medication Sig Dispense Refill     pravastatin (PRAVACHOL) 40 MG tablet Take 1 tablet (40 mg) by mouth daily 90 tablet 3     memantine (NAMENDA) 10 MG tablet TAKE 1 TABLET (10 MG) BY MOUTH 2 TIMES DAILY 180 tablet 3     donepezil (ARICEPT) 10 MG tablet TAKE 1 TABLET BY MOUTH AT BEDTIME 90 tablet 3     cholecalciferol 2000 UNIT tablet Take 1 tablet by mouth daily.       Cyanocobalamin (VITAMIN B-12) 2500 MCG tablet Take 2,500 mcg by  mouth daily. 90 tablet 3     fish oil-omega-3 fatty acids (OMEGA 3) 1000 MG capsule Take 2 capsules by mouth daily. 180 capsule 1     ASPIRIN 81 MG OR TABS 1 TABLET DAILY       Allergies   Allergen Reactions     No Known Drug Allergies      BP Readings from Last 3 Encounters:   08/30/17 106/66   07/24/17 106/60   05/03/17 111/69    Wt Readings from Last 3 Encounters:   07/24/17 158 lb 11.2 oz (72 kg)   05/03/17 163 lb 4.8 oz (74.1 kg)   11/16/16 163 lb (73.9 kg)            Past Surgical History:   Procedure Laterality Date     ARTHROSCOPY KNEE RT/LT  12/04    torn right knee meniscus repair via arthroscopic surgery     HC COLON AIR CONTRAST  11/99    [facility]     HC COLONOSCOPY THRU STOMA, DIAGNOSTIC  4/27/07    normal colonoscopy except for incidental diverticulosis       Reviewed and updated as needed this visit by clinical staff     Reviewed and updated as needed this visit by Provider         ROS:  C: NEGATIVE for fever, chills, change in weight  E/M: NEGATIVE for ear, mouth and throat problems  R: NEGATIVE for significant cough or SOB  CV: NEGATIVE for chest pain, palpitations or peripheral edema  GI: NEGATIVE for nausea, abdominal pain, heartburn, or change in bowel habits  : NEGATIVE for frequency, dysuria, or hematuria  M: NEGATIVE for significant arthralgias or myalgia  H: NEGATIVE for bleeding problems    OBJECTIVE:                                                    /66  Pulse 61  Temp 98.7  F (37.1  C) (Oral)  Wt 164 lb (74.4 kg)  SpO2 95%  BMI 29.52 kg/m2  Body mass index is 29.52 kg/(m^2).  GENERAL: healthy, alert and no distress  RESP: lungs clear to auscultation - no rales, no rhonchi, no wheezes  CV: regular rates and rhythm, normal S1 S2, no S3 or S4 and no murmur, no click or rub -  MS: extremities- no gross deformities noted  PSYCH: Alert and oriented times 3; speech- coherent , normal rate and volume; able to articulate logical thoughts, able to abstract reason, no  tangential thoughts, no hallucinations or delusions, affect- normal     ASSESSMENT/PLAN:                                                      (F03.90) Dementia without behavioral disturbance, unspecified dementia type  (primary encounter diagnosis)  Comment: stable on therapy as noted, will place PPD, recheck 48 hours, forms filled out for  and signed  Plan: [facility] agreed with no restrictions    (E78.5) Hyperlipidemia LDL goal <130  Comment:   LDL Cholesterol Calculated   Date Value Ref Range Status   07/24/2017 115 (H) <100 mg/dL Final     Comment:     Above desirable:  100-129 mg/dl   Borderline High:  130-159 mg/dL   High:             160-189 mg/dL   Very high:       >189 mg/dl       Plan: as noted    See Patient Instructions    Christopher Ramirez MD  [facility]    THE MEDICATION LIST HAS BEEN FULLY RECONCILED BY THE M.D. AND THE NURSING STAFF.

## 2017-08-30 NOTE — MR AVS SNAPSHOT
After Visit Summary   8/30/2017    Micheal Campa    MRN: [id number]           Patient Information     Date Of Birth          1945        Visit Information        Provider Department      8/30/2017 2:20 PM Christopher Ramirez MD [facility]        Today's Diagnoses     Dementia without behavioral disturbance, unspecified dementia type    -  1    Hyperlipidemia LDL goal <130           Follow-ups after your visit        Follow-up notes from your care team     Return if symptoms worsen or fail to improve.      Your next 10 appointments already scheduled     Nov 01, 2017  4:15 PM CDT   SHORT with Kady Chavez, DO   [facility] ([facility]    [address] [facility] [address] [phone]              Who to contact     If you have questions or need follow up information about today's clinic visit or your schedule please contact [facility] directly at [phone].  Normal or non-critical lab and imaging results will be communicated to you by Infrafonehart, letter or phone within 4 business days after the clinic has received the results. If you do not hear from us within 7 days, please contact the clinic through twago - teamwork across global officest or phone. If you have a critical or abnormal lab result, we will notify you by phone as soon as possible.  Submit refill requests through [employer]os[employer] or call your pharmacy and they will forward the refill request to us. Please allow 3 business days for your refill to be completed.          Additional Information About Your Visit        MyChart Information     [employer] gives you secure access to your electronic health record. If you see a primary care provider, you can also send messages to your care team and make appointments. If you have questions, please call your primary care clinic.  If you do not have a primary care provider, please call [phone] and they will assist you.        Care EveryWhere ID     This  is your Care EveryWhere ID. This could be used by other organizations to access your [facility] medical records  [id number]        Your Vitals Were     Pulse Temperature Pulse Oximetry BMI (Body Mass Index)          61 98.7  F (37.1  C) (Oral) 95% 29.52 kg/m2         Blood Pressure from Last 3 Encounters:   08/30/17 106/66   07/24/17 106/60   05/03/17 111/69    Weight from Last 3 Encounters:   08/30/17 164 lb (74.4 kg)   07/24/17 158 lb 11.2 oz (72 kg)   05/03/17 163 lb 4.8 oz (74.1 kg)              Today, you had the following     No orders found for display       Primary Care Provider Office Phone # Fax #    Christopher Ramirez [phone]       [address] [facility] [address]        Equal Access to Services     [facility] HospitalADEBAYO : Hadii aad ku hadasho Soomaali, waaxda luqadaha, qaybta kaalmada adeegyada, waxay sravanthiin hayaan juli stanley . So [facility] [phone].    ATENCIÓN: Si habla español, tiene a robertson disposición servicios gratuitos de asistencia lingüística. Llame al [phone].    We comply with applicable federal civil rights laws and [place] laws. We do not discriminate on the basis of race, color, national origin, age, disability sex, sexual orientation or gender identity.            Thank you!     Thank you for choosing [facility]  for your care. Our goal is always to provide you with excellent care. Hearing back from our patients is one way we can continue to improve our services. Please take a few minutes to complete the written survey that you may receive in the mail after your visit with us. Thank you!             Your Updated Medication List - Protect others around you: Learn how to safely use, store and throw away your medicines at www.disposemymeds.org.          This list is accurate as of: 8/30/17  2:47 PM.  Always use your most recent med list.                   Brand Name Dispense Instructions for use Diagnosis    aspirin 81 MG tablet      1  TABLET DAILY    Routine general medical examination at a health care facility, Other and unspecified hyperlipidemia       cholecalciferol 2000 UNITS tablet      Take 1 tablet by mouth daily.    Vitamin D deficiency       cyabnocobalamin 2500 MCG sublingual tablet    VITAMIN B-12    90 tablet    Take 2,500 mcg by mouth daily.    Short-term memory loss       donepezil 10 MG tablet    ARICEPT    90 tablet    TAKE 1 TABLET BY MOUTH AT BEDTIME    Short-term memory loss       fish oil-omega-3 fatty acids 1000 MG capsule     180 capsule    Take 2 capsules by mouth daily.    Short-term memory loss, Hyperlipidemia LDL goal <130       memantine 10 MG tablet    NAMENDA    180 tablet    TAKE 1 TABLET (10 MG) BY MOUTH 2 TIMES DAILY    Short-term memory loss       pravastatin 40 MG tablet    PRAVACHOL    90 tablet    Take 1 tablet (40 mg) by mouth daily    Hyperlipidemia LDL goal <130

## 2017-08-30 NOTE — NURSING NOTE
"Chief Complaint   Patient presents with     Forms     Annual medical exam report      [facility] Administration       Initial /66  Pulse 61  Temp 98.7  F (37.1  C) (Oral)  Wt 164 lb (74.4 kg)  SpO2 95%  BMI 29.52 kg/m2 Estimated body mass index is 29.52 kg/(m^2) as calculated from the following:    Height as of 7/24/17: 5' 2.5\" (1.588 m).    Weight as of this encounter: 164 lb (74.4 kg).  Medication Reconciliation: complete   Rossi Foote CMA      "

## 2017-09-01 ENCOUNTER — ALLIED HEALTH/NURSE VISIT (OUTPATIENT)
Dept: NURSING | Facility: CLINIC | Age: 72
End: 2017-09-01

## 2017-09-01 DIAGNOSIS — Z11.1 SCREENING EXAMINATION FOR PULMONARY TUBERCULOSIS: Primary | ICD-10-CM

## 2017-09-06 ENCOUNTER — ALLIED HEALTH/NURSE VISIT (OUTPATIENT)
Dept: NURSING | Facility: CLINIC | Age: 72
End: 2017-09-06

## 2017-09-06 DIAGNOSIS — Z11.1 SCREENING EXAMINATION FOR PULMONARY TUBERCULOSIS: Primary | ICD-10-CM

## 2017-09-06 PROCEDURE — 86580 TB INTRADERMAL TEST: CPT

## 2017-09-08 ENCOUNTER — ALLIED HEALTH/NURSE VISIT (OUTPATIENT)
Dept: NURSING | Facility: CLINIC | Age: 72
End: 2017-09-08
Payer: COMMERCIAL

## 2017-09-08 DIAGNOSIS — Z11.1 SCREENING EXAMINATION FOR PULMONARY TUBERCULOSIS: Primary | ICD-10-CM

## 2017-09-08 LAB
PPDINDURATION: 0 MM (ref 0–5)
PPDREDNESS: 0 MM

## 2017-10-23 ENCOUNTER — TRANSFERRED RECORDS (OUTPATIENT)
Dept: HEALTH INFORMATION MANAGEMENT | Facility: CLINIC | Age: 72
End: 2017-10-23

## 2017-11-01 ENCOUNTER — OFFICE VISIT (OUTPATIENT)
Dept: FAMILY MEDICINE | Facility: CLINIC | Age: 72
End: 2017-11-01
Payer: COMMERCIAL

## 2017-11-01 VITALS
BODY MASS INDEX: 29.04 KG/M2 | HEIGHT: 63 IN | HEART RATE: 56 BPM | OXYGEN SATURATION: 97 % | DIASTOLIC BLOOD PRESSURE: 70 MMHG | TEMPERATURE: 97.1 F | WEIGHT: 163.9 LBS | SYSTOLIC BLOOD PRESSURE: 119 MMHG

## 2017-11-01 DIAGNOSIS — F03.90 DEMENTIA WITHOUT BEHAVIORAL DISTURBANCE, UNSPECIFIED DEMENTIA TYPE: Primary | ICD-10-CM

## 2017-11-01 PROCEDURE — 99214 OFFICE O/P EST MOD 30 MIN: CPT | Performed by: INTERNAL MEDICINE

## 2017-11-01 NOTE — MR AVS SNAPSHOT
"              After Visit Summary   11/1/2017    Micheal Campa    MRN: [id number]           Patient Information     Date Of Birth          1945        Visit Information        Provider Department      11/1/2017 4:15 PM Kady Chavez, DO [facility]        Care Instructions    If \"dark moods\" become more significant, consider redirection tactics and/or low dose antidepressant and Dr. Bazan suggested    Continue Day Programs    If you'd like a telephone visit Shu, please let me know on Recommendihart and we can set this up    Otherwise follow up in 6 months          Follow-ups after your visit        Who to contact     If you have questions or need follow up information about today's clinic visit or your schedule please contact [facility] directly at [phone].  Normal or non-critical lab and imaging results will be communicated to you by MyChart, letter or phone within 4 business days after the clinic has received the results. If you do not hear from us within 7 days, please contact the clinic through Recommendihart or phone. If you have a critical or abnormal lab result, we will notify you by phone as soon as possible.  Submit refill requests through J-Kan or call your pharmacy and they will forward the refill request to us. Please allow 3 business days for your refill to be completed.          Additional Information About Your Visit        MyChart Information     J-Kan gives you secure access to your electronic health record. If you see a primary care provider, you can also send messages to your care team and make appointments. If you have questions, please call your primary care clinic.  If you do not have a primary care provider, please call [phone] and they will assist you.        Care EveryWhere ID     This is your Care EveryWhere ID. This could be used by other organizations to access your [place] medical records  [id number]        Your Vitals Were     Pulse Temperature " "Height Pulse Oximetry BMI (Body Mass Index)       56 97.1  F (36.2  C) (Oral) 5' 2.5\" (1.588 m) 97% 29.5 kg/m2        Blood Pressure from Last 3 Encounters:   11/01/17 119/70   08/30/17 106/66   07/24/17 106/60    Weight from Last 3 Encounters:   11/01/17 163 lb 14.4 oz (74.3 kg)   08/30/17 164 lb (74.4 kg)   07/24/17 158 lb 11.2 oz (72 kg)              Today, you had the following     No orders found for display       Primary Care Provider Office Phone # Fax #    Christopher Ramirez [phone]       [address] [place] [id number]        Equal Access to Services     [facility]: Hadii litzy faith hadasho Soomaali, waaxda luqadaha, qaybta kaalmada adeegyada, jesse stanley . So [facility] [phone].    ATENCIÓN: Si habla español, tiene a robertson disposición servicios gratuitos de asistencia lingüística. July al [phone].    We comply with applicable federal civil rights laws and [place] laws. We do not discriminate on the basis of race, color, national origin, age, disability, sex, sexual orientation, or gender identity.            Thank you!     Thank you for choosing [facility]  for your care. Our goal is always to provide you with excellent care. Hearing back from our patients is one way we can continue to improve our services. Please take a few minutes to complete the written survey that you may receive in the mail after your visit with us. Thank you!             Your Updated Medication List - Protect others around you: Learn how to safely use, store and throw away your medicines at www.disposemymeds.org.          This list is accurate as of: 11/1/17  5:11 PM.  Always use your most recent med list.                   Brand Name Dispense Instructions for use Diagnosis    aspirin 81 MG tablet      1 TABLET DAILY    Routine general medical examination at a health care facility, Other and unspecified hyperlipidemia       cholecalciferol 2000 UNITS tablet      " Take 1 tablet by mouth daily.    Vitamin D deficiency       cyabnocobalamin 2500 MCG sublingual tablet    VITAMIN B-12    90 tablet    Take 2,500 mcg by mouth daily.    Short-term memory loss       donepezil 10 MG tablet    ARICEPT    90 tablet    TAKE 1 TABLET BY MOUTH AT BEDTIME    Short-term memory loss       fish oil-omega-3 fatty acids 1000 MG capsule     180 capsule    Take 2 capsules by mouth daily.    Short-term memory loss, Hyperlipidemia LDL goal <130       memantine 10 MG tablet    NAMENDA    180 tablet    TAKE 1 TABLET (10 MG) BY MOUTH 2 TIMES DAILY    Short-term memory loss       pravastatin 40 MG tablet    PRAVACHOL    90 tablet    Take 1 tablet (40 mg) by mouth daily    Hyperlipidemia LDL goal <130

## 2017-11-01 NOTE — PROGRESS NOTES
"MEMORY EVALUATION CLINIC - FOLLOWUP    INTERVAL HISTORY: Mino is here today in follow up of his dementia and is accompanied by his wife Shu. He attends the day program at [facility] once per week and also attends [employer] once per week. This works out well because he enjoys it and likes to remain active and busy. They really enjoyed their [facility] vacation earlier this year and his dementia didn't seem to get in the way at all. Went along with Suh and their kids. Mino actually was able to speak Malay to many of his relatives who still live there.     They did complete some speech therapy last spring and found that beneficial as well. There was an episode last spring where he didn't realize that Shu was his wife.    Vitals stable today and he remains on Aricept and Namenda. Per previsit questionnaire, Shu suggested that she wanted to discuss changes in behavior and personality and \"what to expect next\". She hasn't noticed a pattern, but sometimes he will get into \"dark moods\" both seemingly in body and mind. It lasts for about 15-20 minutes about two times per week and can be any time of day. The only thing she can think of is that he is frustrated by something. He is not violent.     Of note, they still also follow up with neurologist Dr. Bazan annually and just had an appointment a week ago. Note reviewed and similar to information above. He made no changes in Mino's regimen.    ADLs: Now sometimes needs to be shown where the bathroom is and needs his clothes laid out but otherwise still is independent in ADLs  IADLs: All per Shu  Home situation: Condo with wife Shu  Support: Wife and kids      REVIEW OF SYSTEMS: Detailed as above       OBJECTIVE: /70 (BP Location: Right arm, Cuff Size: Adult Regular)  Pulse 56  Temp 97.1  F (36.2  C) (Oral)  Ht 5' 2.5\" (1.588 m)  Wt 163 lb 14.4 oz (74.3 kg)  SpO2 97%  BMI 29.5 kg/m2  Alert, pleasant, nicely groomed, " "NAD  Mild word salad but I'm able to decipher the gist of what he is trying to say  Walks without assist device  Relies on Shu for cues    ASSESSMENT/PLAN: Mino Campa is a very pleasant 71yoM here today in follow up of his early stage dementia likely of the Alzheimer's type. Overall continues to have slight progression as to be expected. Shu will try to find patterns about his moods; discussed avoiding confrontation and using gentle redirection (and if she seems to be the cause of his frustration its ok to step away for a short bit if he is safe). See patient instructions below. Important for Shu to feel supported so could have a telephone encounter too if she wishes in the future.  He will continue with day programs and has been at [employer] with Shu.      Patient Instructions   If \"dark moods\" become more significant, consider redirection tactics and/or low dose antidepressant and Dr. Bazan suggested    Continue Day Programs    If you'd like a telephone visit Shu, please let me know on MyChart and we can set this up    Otherwise follow up in 6 months      MDM: >30 minutes spent with patient/wife, over 50% time counseling, coordinating care and explaining about nature of Shas dementia.    Kady Chavez, DO  Internal Medicine and Geriatrics  [facility] [place]    "

## 2017-11-01 NOTE — PATIENT INSTRUCTIONS
"If \"dark moods\" become more significant, consider redirection tactics and/or low dose antidepressant and Dr. Bazan suggested    Continue Day Programs    If you'd like a telephone visit Shu, please let me know on MyChart and we can set this up    Otherwise follow up in 6 months  "

## 2018-03-09 ENCOUNTER — OFFICE VISIT (OUTPATIENT)
Dept: URGENT CARE | Facility: URGENT CARE | Age: 73
End: 2018-03-09
Payer: COMMERCIAL

## 2018-03-09 VITALS
WEIGHT: 161 LBS | BODY MASS INDEX: 28.98 KG/M2 | HEART RATE: 62 BPM | RESPIRATION RATE: 20 BRPM | DIASTOLIC BLOOD PRESSURE: 62 MMHG | SYSTOLIC BLOOD PRESSURE: 120 MMHG | TEMPERATURE: 98 F

## 2018-03-09 DIAGNOSIS — R32 URINARY INCONTINENCE, UNSPECIFIED TYPE: Primary | ICD-10-CM

## 2018-03-09 LAB
ALBUMIN UR-MCNC: ABNORMAL MG/DL
APPEARANCE UR: CLEAR
BACTERIA #/AREA URNS HPF: ABNORMAL /HPF
BILIRUB UR QL STRIP: ABNORMAL
COLOR UR AUTO: YELLOW
GLUCOSE UR STRIP-MCNC: NEGATIVE MG/DL
HGB UR QL STRIP: ABNORMAL
KETONES UR STRIP-MCNC: ABNORMAL MG/DL
LEUKOCYTE ESTERASE UR QL STRIP: NEGATIVE
NITRATE UR QL: NEGATIVE
NON-SQ EPI CELLS #/AREA URNS LPF: ABNORMAL /LPF
PH UR STRIP: 5.5 PH (ref 5–7)
RBC #/AREA URNS AUTO: ABNORMAL /HPF
SOURCE: ABNORMAL
SP GR UR STRIP: >1.03 (ref 1–1.03)
UROBILINOGEN UR STRIP-ACNC: 0.2 EU/DL (ref 0.2–1)
WBC #/AREA URNS AUTO: ABNORMAL /HPF

## 2018-03-09 PROCEDURE — 99213 OFFICE O/P EST LOW 20 MIN: CPT | Performed by: FAMILY MEDICINE

## 2018-03-09 PROCEDURE — 81001 URINALYSIS AUTO W/SCOPE: CPT | Performed by: FAMILY MEDICINE

## 2018-03-09 RX ORDER — SERTRALINE HYDROCHLORIDE 100 MG/1
TABLET, FILM COATED ORAL
Refills: 11 | COMMUNITY
Start: 2018-02-19

## 2018-03-09 NOTE — MR AVS SNAPSHOT
After Visit Summary   3/9/2018    Micheal Campa    MRN: [id number]           Patient Information     Date Of Birth          1945        Visit Information        Provider Department      3/9/2018 3:15 PM Christopher Jaime, DO [facility]        Today's Diagnoses     Urinary incontinence, unspecified type    -  1       Follow-ups after your visit        Your next 10 appointments already scheduled     May 14, 2018  1:00 PM CDT   [facility] Visit [facility], [facility]    [address] [facility] [id number] [phone]           Bring a current list of meds and any records pertaining to this visit. For Physicals, please bring immunization records and any forms needing to be filled out. Please arrive 10 minutes early to complete paperwork.              Who to contact     If you have questions or need follow up information about today's clinic visit or your schedule please contact [facility] directly at [phone].  Normal or non-critical lab and imaging results will be communicated to you by Iperiahart, letter or phone within 4 business days after the clinic has received the results. If you do not hear from us within 7 days, please contact the clinic through Resolvert or phone. If you have a critical or abnormal lab result, we will notify you by phone as soon as possible.  Submit refill requests through [employer] or call your pharmacy and they will forward the refill request to us. Please allow 3 business days for your refill to be completed.          Additional Information About Your Visit        Iperiahart Information     [employer] gives you secure access to your electronic health record. If you see a primary care provider, you can also send messages to your care team and make appointments. If you have questions, please call your primary care clinic.  If you do not have a primary care provider,  please call [phone] and they will assist you.        Care EveryWhere ID     This is your Care EveryWhere ID. This could be used by other organizations to access your [place] medical records  [id number]        Your Vitals Were     Pulse Temperature Respirations BMI (Body Mass Index)          62 98  F (36.7  C) (Oral) 20 28.98 kg/m2         Blood Pressure from Last 3 Encounters:   03/09/18 120/62   11/01/17 119/70   08/30/17 106/66    Weight from Last 3 Encounters:   03/09/18 161 lb (73 kg)   11/01/17 163 lb 14.4 oz (74.3 kg)   08/30/17 164 lb (74.4 kg)              We Performed the Following     UA with Microscopic reflex to Culture        Primary Care Provider Office Phone # Fax #    Christopher Ramirez [phone]       [address] [facility] [address]        Equal Access to Services     ALBERT KRAMER : Hadii aad ku hadasho Soomaali, waaxda luqadaha, qaybta kaalmada adeegyada, jesse stanley . So [facility] [phone].    ATENCIÓN: Si habla español, tiene a robertson disposición servicios gratuitos de asistencia lingüística. Llame al [phone].    We comply with applicable federal civil rights laws and [place] laws. We do not discriminate on the basis of race, color, national origin, age, disability, sex, sexual orientation, or gender identity.            Thank you!     Thank you for choosing [facility]  for your care. Our goal is always to provide you with excellent care. Hearing back from our patients is one way we can continue to improve our services. Please take a few minutes to complete the written survey that you may receive in the mail after your visit with us. Thank you!             Your Updated Medication List - Protect others around you: Learn how to safely use, store and throw away your medicines at www.disposemymeds.org.          This list is accurate as of 3/9/18  4:17 PM.  Always use your most recent med list.                    Brand Name Dispense Instructions for use Diagnosis    aspirin 81 MG tablet      1 TABLET DAILY    Routine general medical examination at a health care facility, Other and unspecified hyperlipidemia       cholecalciferol 2000 UNITS tablet      Take 1 tablet by mouth daily.    Vitamin D deficiency       cyanocobalamin 2500 MCG sublingual tablet    VITAMIN B-12    90 tablet    Take 2,500 mcg by mouth daily.    Short-term memory loss       donepezil 10 MG tablet    ARICEPT    90 tablet    TAKE 1 TABLET BY MOUTH AT BEDTIME    Short-term memory loss       fish oil-omega-3 fatty acids 1000 MG capsule     180 capsule    Take 2 capsules by mouth daily.    Short-term memory loss, Hyperlipidemia LDL goal <130       memantine 10 MG tablet    NAMENDA    180 tablet    TAKE 1 TABLET (10 MG) BY MOUTH 2 TIMES DAILY    Short-term memory loss       pravastatin 40 MG tablet    PRAVACHOL    90 tablet    Take 1 tablet (40 mg) by mouth daily    Hyperlipidemia LDL goal <130       sertraline 100 MG tablet    ZOLOFT     TAKE 1 & 1/2 TABLETS BY MOUTH ONCE DAILY.

## 2018-03-15 NOTE — PROGRESS NOTES
SUBJECTIVE: Micheal Campa is a 72 year old male who  presents today for a possible UTI.   Symptoms of incontinance have been going on forday(s).    Hematuria no.  still presentand mild and moderate.    There is no history of fever, chills, nausea or vomiting.   This patient does have a history of urinary tract infections.   Patient denies long duration and flank pain    Past Medical History:   Diagnosis Date     Dementia 6/1/11     Derangement of meniscus, not elsewhere classified 12/04    RT knee meniscus tear, repaired via scope     Diverticulosis of large intestine without diverticulitis 4/27/07    incidental diverticulosis seen on colonoscopy     Other and unspecified hyperlipidemia          Vitamin D deficiency      Allergies   Allergen Reactions     No Known Drug Allergies      Social History   Substance Use Topics     Smoking status: Never Smoker     Smokeless tobacco: Never Used     Alcohol use 0.0 oz/week     0 Standard drinks or equivalent per week      Comment: rare       ROS: CONSTITUTIONAL:NEGATIVE for fever, chills, change in weight    OBJECTIVE:  /62  Pulse 62  Temp 98  F (36.7  C) (Oral)  Resp 20  Wt 161 lb (73 kg)  BMI 28.98 kg/m2    No Flank/abd pain      ICD-10-CM    1. Urinary incontinence, unspecified type R32      F/u Urology  Drink plenty of fluids.  Prevention and treatment of UTI's discussed.Signs and symptoms of pyelonephritis mentioned.  Follow up with primary care physician if not improving

## 2018-04-02 DIAGNOSIS — F03.90 DEMENTIA WITHOUT BEHAVIORAL DISTURBANCE, UNSPECIFIED DEMENTIA TYPE: Primary | ICD-10-CM

## 2018-04-02 LAB
ALBUMIN SERPL-MCNC: 3.9 G/DL (ref 3.4–5)
ALP SERPL-CCNC: 51 U/L (ref 40–150)
ALT SERPL W P-5'-P-CCNC: 37 U/L (ref 0–70)
ANION GAP SERPL CALCULATED.3IONS-SCNC: 5 MMOL/L (ref 3–14)
AST SERPL W P-5'-P-CCNC: 32 U/L (ref 0–45)
BILIRUB SERPL-MCNC: 1.6 MG/DL (ref 0.2–1.3)
BUN SERPL-MCNC: 17 MG/DL (ref 7–30)
CALCIUM SERPL-MCNC: 8.9 MG/DL (ref 8.5–10.1)
CHLORIDE SERPL-SCNC: 107 MMOL/L (ref 94–109)
CO2 SERPL-SCNC: 29 MMOL/L (ref 20–32)
CREAT SERPL-MCNC: 0.99 MG/DL (ref 0.66–1.25)
GFR SERPL CREATININE-BSD FRML MDRD: 75 ML/MIN/1.7M2
GLUCOSE SERPL-MCNC: 87 MG/DL (ref 70–99)
POTASSIUM SERPL-SCNC: 4 MMOL/L (ref 3.4–5.3)
PROT SERPL-MCNC: 7.2 G/DL (ref 6.8–8.8)
SODIUM SERPL-SCNC: 141 MMOL/L (ref 133–144)

## 2018-04-02 PROCEDURE — 36415 COLL VENOUS BLD VENIPUNCTURE: CPT | Performed by: INTERNAL MEDICINE

## 2018-04-02 PROCEDURE — 80053 COMPREHEN METABOLIC PANEL: CPT | Performed by: INTERNAL MEDICINE

## 2018-04-02 NOTE — PROGRESS NOTES
Per  patient scheduled for lab only appointment for 6 month liver function recheck per last lab letter. Please place lab orders if appropriate.

## 2018-04-02 NOTE — LETTER
[facility]  [address] [address]  [phone]      4/2/2018       Micheal Campa  [address]   [facility] [address]        Dear Micheal,    I am pleased to inform you that your routine blood work including your sodium, potassium, calcium, kidney and liver function tests are all remain stable.     I would consider repeating this test in 6-12 months for comparison.    Sincerely,      Christopher Ramirez MD  Internal Medicine

## 2018-05-14 ENCOUNTER — OFFICE VISIT (OUTPATIENT)
Dept: FAMILY MEDICINE | Facility: CLINIC | Age: 73
End: 2018-05-14
Payer: COMMERCIAL

## 2018-05-14 VITALS
HEIGHT: 62 IN | HEART RATE: 65 BPM | SYSTOLIC BLOOD PRESSURE: 111 MMHG | WEIGHT: 157.2 LBS | DIASTOLIC BLOOD PRESSURE: 68 MMHG | TEMPERATURE: 99.1 F | OXYGEN SATURATION: 96 % | BODY MASS INDEX: 28.93 KG/M2

## 2018-05-14 DIAGNOSIS — F03.90 DEMENTIA WITHOUT BEHAVIORAL DISTURBANCE, UNSPECIFIED DEMENTIA TYPE: Primary | ICD-10-CM

## 2018-05-14 DIAGNOSIS — R32 URINARY INCONTINENCE, UNSPECIFIED TYPE: ICD-10-CM

## 2018-05-14 LAB
ALBUMIN UR-MCNC: NEGATIVE MG/DL
APPEARANCE UR: CLEAR
BILIRUB UR QL STRIP: ABNORMAL
COLOR UR AUTO: YELLOW
GLUCOSE UR STRIP-MCNC: NEGATIVE MG/DL
HGB UR QL STRIP: ABNORMAL
KETONES UR STRIP-MCNC: 15 MG/DL
LEUKOCYTE ESTERASE UR QL STRIP: NEGATIVE
NITRATE UR QL: NEGATIVE
NON-SQ EPI CELLS #/AREA URNS LPF: NORMAL /LPF
PH UR STRIP: 5.5 PH (ref 5–7)
RBC #/AREA URNS AUTO: NORMAL /HPF
SOURCE: ABNORMAL
SP GR UR STRIP: 1.02 (ref 1–1.03)
UROBILINOGEN UR STRIP-ACNC: 0.2 EU/DL (ref 0.2–1)
WBC #/AREA URNS AUTO: NORMAL /HPF

## 2018-05-14 PROCEDURE — 99215 OFFICE O/P EST HI 40 MIN: CPT | Performed by: INTERNAL MEDICINE

## 2018-05-14 PROCEDURE — 81001 URINALYSIS AUTO W/SCOPE: CPT | Performed by: INTERNAL MEDICINE

## 2018-05-14 NOTE — PATIENT INSTRUCTIONS
Urine sample today    Ok to reduce B12 down from 2,500 mcg daily to 1,000 mcg daily     Alternatives to Sertraline could be: Lexapro (Escitalopram) or Celexa (Citalopram) that you can discuss with Dr. Bazan. If he wants me to take over this transition please let me know.     Follow up in at least 6 months

## 2018-05-14 NOTE — MR AVS SNAPSHOT
After Visit Summary   5/14/2018    Micheal Campa    MRN: [id number]           Patient Information     Date Of Birth          1945        Visit Information        Provider Department      5/14/2018 1:00 PM Kady Chavez,  [facility]        Today's Diagnoses     Dementia without behavioral disturbance, unspecified dementia type    -  1    Urinary incontinence, unspecified type          Care Instructions    Urine sample today    Ok to reduce B12 down from 2,500 mcg daily to 1,000 mcg daily     Alternatives to Sertraline could be: Lexapro (Escitalopram) or Celexa (Citalopram) that you can discuss with Dr. Bazan. If he wants me to take over this transition please let me know.     Follow up in at least 6 months            Follow-ups after your visit        Who to contact     If you have questions or need follow up information about today's clinic visit or your schedule please contact [facility] directly at [phone].  Normal or non-critical lab and imaging results will be communicated to you by Innotrievehart, letter or phone within 4 business days after the clinic has received the results. If you do not hear from us within 7 days, please contact the clinic through Kreditst or phone. If you have a critical or abnormal lab result, we will notify you by phone as soon as possible.  Submit refill requests through [employer] or call your pharmacy and they will forward the refill request to us. Please allow 3 business days for your refill to be completed.          Additional Information About Your Visit        MyChart Information     [employer] gives you secure access to your electronic health record. If you see a primary care provider, you can also send messages to your care team and make appointments. If you have questions, please call your primary care clinic.  If you do not have a primary care provider, please call [phone] and they will assist you.        Care EveryWhere ID      "This is your Care EveryWhere ID. This could be used by other organizations to access your [place] medical records  [id number]        Your Vitals Were     Pulse Temperature Height Pulse Oximetry BMI (Body Mass Index)       65 99.1  F (37.3  C) (Oral) 5' 2.25\" (1.581 m) 96% 28.52 kg/m2        Blood Pressure from Last 3 Encounters:   05/14/18 111/68   03/09/18 120/62   11/01/17 119/70    Weight from Last 3 Encounters:   05/14/18 157 lb 3.2 oz (71.3 kg)   03/09/18 161 lb (73 kg)   11/01/17 163 lb 14.4 oz (74.3 kg)              We Performed the Following     *UA reflex to Microscopic and Culture ([place] [facility] [place] [facility] (except [place] and [place])          Today's Medication Changes          These changes are accurate as of 5/14/18  2:04 PM.  If you have any questions, ask your nurse or doctor.               Stop taking these medicines if you haven't already. Please contact your care team if you have questions.     cyanocobalamin 2500 MCG sublingual tablet   Commonly known as:  VITAMIN B-12   Stopped by:  Kady Chavez DO                    Primary Care Provider Office Phone # Fax #    Christopher Ramirez [phone]       [address] [place] [phone]        Equal Access to Services     CHUCKIE KRAMER AH: Hadrandall magdalenoo Sozoey, waaxda luqadaha, qaybta kaaljesse barajas. So [facility] [phone].    ATENCIÓN: Si habla español, tiene a robertson disposición servicios gratuitos de asistencia lingüística. July huffman [phone].    We comply with applicable federal civil rights laws and [place] laws. We do not discriminate on the basis of race, color, national origin, age, disability, sex, sexual orientation, or gender identity.            Thank you!     Thank you for choosing [facility]  for your care. Our goal is always to provide you with excellent care. Hearing back from our patients is one way we can continue to improve our " services. Please take a few minutes to complete the written survey that you may receive in the mail after your visit with us. Thank you!             Your Updated Medication List - Protect others around you: Learn how to safely use, store and throw away your medicines at www.disposemymeds.org.          This list is accurate as of 5/14/18  2:04 PM.  Always use your most recent med list.                   Brand Name Dispense Instructions for use Diagnosis    aspirin 81 MG tablet      1 TABLET DAILY    Routine general medical examination at a health care facility, Other and unspecified hyperlipidemia       B-12 1000 MCG Tbdp      Take 1,000 mcg by mouth        cholecalciferol 2000 units tablet      Take 1 tablet by mouth daily.    Vitamin D deficiency       donepezil 10 MG tablet    ARICEPT    90 tablet    TAKE 1 TABLET BY MOUTH AT BEDTIME    Short-term memory loss       memantine 10 MG tablet    NAMENDA    180 tablet    TAKE 1 TABLET (10 MG) BY MOUTH 2 TIMES DAILY    Short-term memory loss       pravastatin 40 MG tablet    PRAVACHOL    90 tablet    Take 1 tablet (40 mg) by mouth daily    Hyperlipidemia LDL goal <130       sertraline 100 MG tablet    ZOLOFT     TAKE 1 & 1/2 TABLETS BY MOUTH ONCE DAILY.

## 2018-05-25 ENCOUNTER — TELEPHONE (OUTPATIENT)
Dept: FAMILY MEDICINE | Facility: CLINIC | Age: 73
End: 2018-05-25

## 2018-05-25 DIAGNOSIS — Z12.11 SPECIAL SCREENING FOR MALIGNANT NEOPLASMS, COLON: Primary | ICD-10-CM

## 2018-05-25 NOTE — TELEPHONE ENCOUNTER
Hi,   Can you please call Mimi Fuentes at [phone] regarding a question she has that can't be discussed using My Chart?     Thanks,  Joanne

## 2018-05-25 NOTE — TELEPHONE ENCOUNTER
Spoke with Shu - she is still undecided about his stools. Decided ok for FIT and would be reassured if normal (but in understandable conundrum if abnormal as he wouldn't tolerate colonoscopy). She did ask Dr. Bazan about Sertraline change to another SSRI but he suggested Risperdal instead which she is unsure if she wants to do. She will ponder this and update me PRN.

## 2018-05-27 PROCEDURE — 82274 ASSAY TEST FOR BLOOD FECAL: CPT | Performed by: INTERNAL MEDICINE

## 2018-06-02 DIAGNOSIS — Z12.11 SPECIAL SCREENING FOR MALIGNANT NEOPLASMS, COLON: ICD-10-CM

## 2018-06-05 LAB — HEMOCCULT STL QL IA: NEGATIVE

## 2018-06-21 DIAGNOSIS — F03.90 DEMENTIA WITHOUT BEHAVIORAL DISTURBANCE, UNSPECIFIED DEMENTIA TYPE: Primary | ICD-10-CM

## 2018-06-21 RX ORDER — DONEPEZIL HYDROCHLORIDE 10 MG/1
TABLET, FILM COATED ORAL
Qty: 90 TABLET | Refills: 3 | Status: SHIPPED | OUTPATIENT
Start: 2018-06-21

## 2018-06-21 RX ORDER — MEMANTINE HYDROCHLORIDE 10 MG/1
TABLET ORAL
Qty: 180 TABLET | Refills: 3 | Status: SHIPPED | OUTPATIENT
Start: 2018-06-21

## 2018-06-21 NOTE — TELEPHONE ENCOUNTER
Pt see Dr. Ramirez and Dr Mohsen Chavez .Anita Sharma RN   wife asking should they see both providers or see Dr. Chavez for both ?

## 2018-06-21 NOTE — TELEPHONE ENCOUNTER
Should the continue to see that MD just for memory and you as PCP or Dr. Chavez as both ? Will send prescriptions to Dr. Cancino .Anita Sharma RN

## 2018-06-21 NOTE — TELEPHONE ENCOUNTER
"Requested Prescriptions   Pending Prescriptions Disp Refills     memantine (NAMENDA) 10 MG tablet [Pharmacy Med Name: MEMANTINE HCL 10 MG TABLET] 180 tablet 3      Last Written Prescription Date:  07/05/17  Last Fill Quantity: 180,  # refills: 3   Last office visit: 8/30/2017 with prescribing provider:  08/30/17   Future Office Visit:  0 Sig: TAKE 1 TABLET (10 MG) BY MOUTH 2 TIMES DAILY    Miscellaneous Dementia Agents Passed    6/21/2018  9:37 AM       Passed - Recent (12 mo) or future (30 days) visit within the authorizing provider's specialty    Patient had office visit in the last 12 months or has a visit in the next 30 days with authorizing provider or within the authorizing provider's specialty.  See \"Patient Info\" tab in inbasket, or \"Choose Columns\" in Meds & Orders section of the refill encounter.           Passed - Patient is 18 years of age or older        donepezil (ARICEPT) 10 MG tablet [Pharmacy Med Name: DONEPEZIL HCL 10 MG TABLET] 90 tablet 3    Last Written Prescription Date:  07/5/17  Last Fill Quantity: 90,  # refills: 3   Last office visit: 8/30/2017 with prescribing provider:  08/30/17   Future Office Visit:  0 Sig: TAKE 1 TABLET BY MOUTH AT BEDTIME    Miscellaneous Dementia Agents Passed    6/21/2018  9:37 AM       Passed - Recent (12 mo) or future (30 days) visit within the authorizing provider's specialty    Patient had office visit in the last 12 months or has a visit in the next 30 days with authorizing provider or within the authorizing provider's specialty.  See \"Patient Info\" tab in inbasket, or \"Choose Columns\" in Meds & Orders section of the refill encounter.           Passed - Patient is 18 years of age or older        "

## 2018-06-22 ENCOUNTER — TRANSFERRED RECORDS (OUTPATIENT)
Dept: HEALTH INFORMATION MANAGEMENT | Facility: CLINIC | Age: 73
End: 2018-06-22

## 2018-07-02 ENCOUNTER — TELEPHONE (OUTPATIENT)
Dept: INTERNAL MEDICINE | Facility: CLINIC | Age: 73
End: 2018-07-02

## 2018-07-02 NOTE — TELEPHONE ENCOUNTER
annual Medical Examination report most recent date seen 8/30 17; form in md slot on [facility]

## 2018-07-12 DIAGNOSIS — E78.5 HYPERLIPIDEMIA LDL GOAL <130: ICD-10-CM

## 2018-07-12 RX ORDER — PRAVASTATIN SODIUM 40 MG
TABLET ORAL
Qty: 90 TABLET | Refills: 3 | OUTPATIENT
Start: 2018-07-12

## 2018-07-12 NOTE — TELEPHONE ENCOUNTER
"Requested Prescriptions   Pending Prescriptions Disp Refills     pravastatin (PRAVACHOL) 40 MG tablet [Pharmacy Med Name: PRAVASTATIN SODIUM 40 MG TAB] 90 tablet 3     Sig: TAKE 1 TABLET (40 MG) BY MOUTH DAILY    Statins Protocol Passed    7/12/2018  1:22 AM       Passed - LDL on file in past 12 months    Recent Labs   Lab Test  07/24/17   1204   LDL  115*            Passed - No abnormal creatine kinase in past 12 months    No lab results found.            Passed - Recent (12 mo) or future (30 days) visit within the authorizing provider's specialty    Patient had office visit in the last 12 months or has a visit in the next 30 days with authorizing provider or within the authorizing provider's specialty.  See \"Patient Info\" tab in inbasket, or \"Choose Columns\" in Meds & Orders section of the refill encounter.           Passed - Patient is age 18 or older        Routing refill request to provider for review/approval because:  Labs out of range:  LDL        "

## 2018-07-25 ENCOUNTER — OFFICE VISIT (OUTPATIENT)
Dept: INTERNAL MEDICINE | Facility: CLINIC | Age: 73
End: 2018-07-25
Payer: COMMERCIAL

## 2018-07-25 VITALS
RESPIRATION RATE: 15 BRPM | HEART RATE: 64 BPM | WEIGHT: 159 LBS | OXYGEN SATURATION: 98 % | HEIGHT: 62 IN | SYSTOLIC BLOOD PRESSURE: 124 MMHG | TEMPERATURE: 97.8 F | BODY MASS INDEX: 29.26 KG/M2 | DIASTOLIC BLOOD PRESSURE: 70 MMHG

## 2018-07-25 DIAGNOSIS — Z12.11 COLON CANCER SCREENING: ICD-10-CM

## 2018-07-25 DIAGNOSIS — Z12.5 SPECIAL SCREENING FOR MALIGNANT NEOPLASM OF PROSTATE: ICD-10-CM

## 2018-07-25 DIAGNOSIS — E78.5 HYPERLIPIDEMIA LDL GOAL <130: ICD-10-CM

## 2018-07-25 DIAGNOSIS — F03.90 DEMENTIA WITHOUT BEHAVIORAL DISTURBANCE, UNSPECIFIED DEMENTIA TYPE: ICD-10-CM

## 2018-07-25 DIAGNOSIS — Z00.00 MEDICARE ANNUAL WELLNESS VISIT, SUBSEQUENT: Primary | ICD-10-CM

## 2018-07-25 LAB
CHOLEST SERPL-MCNC: 171 MG/DL
HDLC SERPL-MCNC: 67 MG/DL
HGB BLD-MCNC: 15 G/DL (ref 13.3–17.7)
LDLC SERPL CALC-MCNC: 86 MG/DL
NONHDLC SERPL-MCNC: 104 MG/DL
PSA SERPL-ACNC: 0.18 UG/L (ref 0–4)
TRIGL SERPL-MCNC: 89 MG/DL

## 2018-07-25 PROCEDURE — 36415 COLL VENOUS BLD VENIPUNCTURE: CPT | Performed by: INTERNAL MEDICINE

## 2018-07-25 PROCEDURE — 80061 LIPID PANEL: CPT | Performed by: INTERNAL MEDICINE

## 2018-07-25 PROCEDURE — 85018 HEMOGLOBIN: CPT | Performed by: INTERNAL MEDICINE

## 2018-07-25 PROCEDURE — G0103 PSA SCREENING: HCPCS | Performed by: INTERNAL MEDICINE

## 2018-07-25 PROCEDURE — G0439 PPPS, SUBSEQ VISIT: HCPCS | Performed by: INTERNAL MEDICINE

## 2018-07-25 RX ORDER — PRAVASTATIN SODIUM 40 MG
40 TABLET ORAL DAILY
Qty: 90 TABLET | Refills: 3 | Status: SHIPPED | OUTPATIENT
Start: 2018-07-25

## 2018-07-25 RX ORDER — GABAPENTIN 100 MG/1
1 CAPSULE ORAL 2 TIMES DAILY
Refills: 6 | COMMUNITY
Start: 2018-07-21

## 2018-07-25 ASSESSMENT — ACTIVITIES OF DAILY LIVING (ADL)
I_NEED_ASSISTANCE_FOR_THE_FOLLOWING_DAILY_ACTIVITIES:: LAUNDRY
I_NEED_ASSISTANCE_FOR_THE_FOLLOWING_DAILY_ACTIVITIES:: SHOPPING
I_NEED_ASSISTANCE_FOR_THE_FOLLOWING_DAILY_ACTIVITIES:: HOUSEWORK
I_NEED_ASSISTANCE_FOR_THE_FOLLOWING_DAILY_ACTIVITIES:: MEDICATION ADMINISTRATION
CURRENT_FUNCTION: PREPARING MEALS REQUIRES ASSISTANCE
CURRENT_FUNCTION: MEDICATION ADMINISTRATION REQUIRES ASSISTANCE
CURRENT_FUNCTION: TRANSPORTATION REQUIRES ASSISTANCE
CURRENT_FUNCTION: HOUSEWORK REQUIRES ASSISTANCE
I_NEED_ASSISTANCE_FOR_THE_FOLLOWING_DAILY_ACTIVITIES:: PREPARING MEALS
CURRENT_FUNCTION: LAUNDRY REQUIRES ASSISTANCE
I_NEED_ASSISTANCE_FOR_THE_FOLLOWING_DAILY_ACTIVITIES:: TELEPHONE USE
CURRENT_FUNCTION: MONEY MANAGEMENT REQUIRES ASSISTANCE
CURRENT_FUNCTION: SHOPPING REQUIRES ASSISTANCE
I_NEED_ASSISTANCE_FOR_THE_FOLLOWING_DAILY_ACTIVITIES:: BATHING
CURRENT_FUNCTION: TELEPHONE REQUIRES ASSISTANCE
I_NEED_ASSISTANCE_FOR_THE_FOLLOWING_DAILY_ACTIVITIES:: TRANSPORTATION
I_NEED_ASSISTANCE_FOR_THE_FOLLOWING_DAILY_ACTIVITIES:: MONEY MANAGEMENT
CURRENT_FUNCTION: BATHING REQUIRES ASSISTANCE

## 2018-07-25 ASSESSMENT — PAIN SCALES - GENERAL: PAINLEVEL: NO PAIN (0)

## 2018-07-25 NOTE — PROGRESS NOTES
SUBJECTIVE:      Micheal Campa is a 71 year old male who presents for Preventive Visit.      Are you in the first 12 months of your Medicare Part B coverage?  No    Answers for HPI/ROS submitted by the patient on 7/25/2018   Annual Exam:  Getting at least 3 servings of Calcium per day:: Yes  Bi-annual eye exam:: Yes  Dental care twice a year:: Yes  Sleep apnea or symptoms of sleep apnea:: None  Diet:: Regular (no restrictions)  Taking medications regularly:: No  Medication side effects:: Not applicable  Additional concerns today:: No  Activities of Daily Living: telephone requires assistance, transportation requires assistance, shopping requires assistance, preparing meals requires assistance, housework requires assistance, bathing requires assistance, laundry requires assistance, medication administration requires assistance, money management requires assistance  Home safety: lack of grab bars in the bathroom  Hearing Impairment:: no hearing concerns  PHQ-2 Score: 0  Barriers to taking medications:: Other  COGNITIVE SCREENING:  Not appropriate due to known dementia          Other concerns to address:    1. Forms for son's FMLA to care for    2. Discuss POLST      All Histories reviewed and updated in EPIC as appropriate.              History   Substance Use Topics     Smoking status: Never Smoker      Smokeless tobacco: Never Used     Alcohol Use: 0.0 oz/week         0 Standard drinks or equivalent per week            Comment: rare           The patient does not drink >3 drinks per day nor >7 drinks per week.      Today's PHQ-2 Score:   PHQ-2 ( 1999 Pfizer) 6/25/2016 12/9/2015   Q1: Little interest or pleasure in doing things - 0   Q2: Feeling down, depressed or hopeless - 0   PHQ-2 Score - 0   Little interest or pleasure in doing things Not at all -   Feeling down, depressed or hopeless Several days -   PHQ-2 Score 1 -           Do you feel safe in your environment - Yes      Do you have a Health Care Directive?:  Yes: Advance Directive has been received and scanned.      Current providers sharing in care for this patient include:   Patient Care Team:  Christopher Ramirez MD as PCP - General (Internal Medicine)        Hearing impairment: No    Ability to successfully perform activities of daily living: No, needs assistance with: transportation, medications and managing money     Fall risk:       Home safety:  none identified         The following health maintenance items are reviewed in Epic and correct as of today:          Health Maintenance   Topic Date Due     HEPATITIS C SCREENING  12/17/1963     AORTIC ANEURYSM SCREENING (SYSTEM ASSIGNED)  12/17/2011     LIPID MONITORING Q1 YEAR( NO INBASKET)  07/09/2016     INFLUENZA VACCINE (SYSTEM ASSIGNED)  09/01/2016     FALL RISK ASSESSMENT  12/09/2016     COLONOSCOPY Q10 YR INBASKET MESSAGE  04/27/2017     ADVANCE DIRECTIVE PLANNING Q5 YRS (NO INBASKET)  12/17/2020     TETANUS IMMUNIZATION (SYSTEM ASSIGNED)  06/27/2023     PNEUMOCOCCAL  Completed                       Immunization History   Administered Date(s) Administered     Hepatitis A 02/18/1998, 01/06/2014, 07/01/2014     Influenza (High Dose) 3 valent vaccine 10/01/2015     Influenza (IIV3) 10/07/2010, 09/01/2012, 09/15/2013     Pneumococcal (PCV 13) 12/09/2015     Pneumococcal 23 valent 11/05/2012     TD (ADULT, 7+) 05/25/2004     TDAP (ADACEL AGES 11-64) 06/27/2013     Zoster vaccine, live 11/06/2012           ROS:  C: NEGATIVE for fever, chills, change in weight  E/M: NEGATIVE for ear, mouth and throat problems  R: NEGATIVE for significant cough or SOB  CV: NEGATIVE for chest pain, palpitations or peripheral edema  GI: NEGATIVE for nausea, abdominal pain, heartburn, or change in bowel habits  : NEGATIVE for frequency, dysuria, or hematuria  M: NEGATIVE for significant arthralgias or myalgia  N: NEGATIVE for weakness, dizziness or paresthesias  NEURO: + baseline memory changes  H: NEGATIVE for bleeding problems  P:  "NEGATIVE for changes in mood or affect      OBJECTIVE:      /70  Pulse 64  Temp 97.8  F (36.6  C) (Oral)  Resp 15  Ht 5' 2.25\" (1.581 m)  Wt 159 lb (72.1 kg)  SpO2 98%  BMI 28.85 kg/m2    EXAM:   GENERAL: alert and no distress  EYES: Eyes grossly normal to inspection, PERRL and conjunctivae and sclerae normal  HENT: ear canals and TM's normal, nose and mouth without ulcers or lesions  NECK: no adenopathy, no asymmetry, masses, or scars and thyroid normal to palpation  RESP: lungs clear to auscultation - no rales, rhonchi or wheezes  CV: regular rate and rhythm, normal S1 S2, no S3 or S4, no click or rub, no peripheral edema and peripheral pulses strong  ABDOMEN: soft, nontender, no hepatosplenomegaly, no masses and bowel sounds normal  MS: no gross musculoskeletal defects noted  Small slightly prominent soft tissue prominence noted over dorsum of hands bilaterally at base of thumb left greater than right, nontender, discussed with wife and patient and will observe  Mild dystrophic nails noted bilateral feet.  NEURO: mild baseline short term memory changes  PSYCH: mentation appears normal when responds, affect normal/bright per baseline but does not talk much likely due to baseline memory impairment      ASSESSMENT/PLAN:     (Z00.00) Medicare annual wellness visit, subsequent  (primary encounter diagnosis)  Comment: Stable on current therapy continue with medical management per  Plan: Hemoglobin, Lipid Profile        Discussed colonoscopy but this was electively declined.  Discussed update of all vaccinations per pharmacy    (F03.90) Dementia without behavioral disturbance, unspecified dementia type  Comment: Stable and followed to the neurology clinic on anticholinesterase inhibitors per  Plan: Mild behavior changes managed with Neurontin    (E78.5) Hyperlipidemia LDL goal <130  Comment: Labs ordered as fasting per routine continue with the  Plan: Lipid Profile, pravastatin (PRAVACHOL) 40 MG         " tablet            (Z12.5) Special screening for malignant neoplasm of prostate  Comment: Ordered as routine screening discussed with patient clinical need  Plan: PSA, screen            (Z12.11) Colon cancer screening  Comment: Declined after discussion  Plan:       THE MEDICATION LIST HAS BEEN FULLY RECONCILED BY THE [facility]NA AND THE NURSING STAFF.

## 2018-07-25 NOTE — MR AVS SNAPSHOT
After Visit Summary   7/25/2018    Micheal aCmpa    MRN: [id number]           Patient Information     Date Of Birth          1945        Visit Information        Provider Department      7/25/2018 10:00 AM Christopher Ramirez MD [facility]        Today's Diagnoses     Medicare annual wellness visit, subsequent    -  1    Dementia without behavioral disturbance, unspecified dementia type        Hyperlipidemia LDL goal <130        Special screening for malignant neoplasm of prostate        Colon cancer screening           Follow-ups after your visit        Who to contact     If you have questions or need follow up information about today's clinic visit or your schedule please contact [facility] directly at [phone].  Normal or non-critical lab and imaging results will be communicated to you by MyChart, letter or phone within 4 business days after the clinic has received the results. If you do not hear from us within 7 days, please contact the clinic through [employer] or phone. If you have a critical or abnormal lab result, we will notify you by phone as soon as possible.  Submit refill requests through [email or web address] or call your pharmacy and they will forward the refill request to us. Please allow 3 business days for your refill to be completed.          Additional Information About Your Visit        MyChart Information     [email or web address] gives you secure access to your electronic health record. If you see a primary care provider, you can also send messages to your care team and make appointments. If you have questions, please call your primary care clinic.  If you do not have a primary care provider, please call [phone] and they will assist you.        Care EveryWhere ID     This is your Care EveryWhere ID. This could be used by other organizations to access your [facility] medical records  [id number]        Your Vitals Were     Pulse Temperature  "Respirations Height Pulse Oximetry BMI (Body Mass Index)    64 97.8  F (36.6  C) (Oral) 15 5' 2.25\" (1.581 m) 98% 28.85 kg/m2       Blood Pressure from Last 3 Encounters:   07/25/18 124/70   05/14/18 111/68   03/09/18 120/62    Weight from Last 3 Encounters:   07/25/18 159 lb (72.1 kg)   05/14/18 157 lb 3.2 oz (71.3 kg)   03/09/18 161 lb (73 kg)              We Performed the Following     Hemoglobin     Lipid Profile     PSA, screen          Where to get your medicines      These medications were sent to [place] [address] [place] [facility] [address]  [address] [facility], [facility] [address]     Phone:  [phone]     pravastatin 40 MG tablet          Primary Care Provider Office Phone # Fax #    Christopher Ramirez [phone]       [address] [place] [id number]        Equal Access to Services     [facility]: Hadii aad ku hadasho Soomaali, waaxda luqadaha, qaybta kaalmada adeegyada, waxlinda chong haymodesta stanley . So [facility] [phone].    ATENCIÓN: Si habla español, tiene a robertson disposición servicios gratuitos de asistencia lingüística. Llame al [phone].    We comply with applicable federal civil rights laws and [place] laws. We do not discriminate on the basis of race, color, national origin, age, disability, sex, sexual orientation, or gender identity.            Thank you!     Thank you for choosing [facility]  for your care. Our goal is always to provide you with excellent care. Hearing back from our patients is one way we can continue to improve our services. Please take a few minutes to complete the written survey that you may receive in the mail after your visit with us. Thank you!             Your Updated Medication List - Protect others around you: Learn how to safely use, store and throw away your medicines at www.disposemymeds.org.          This list is accurate as of 7/25/18 10:46 AM.  Always use your most recent med list. "                   Brand Name Dispense Instructions for use Diagnosis    aspirin 81 MG tablet      1 TABLET DAILY    Routine general medical examination at a health care facility, Other and unspecified hyperlipidemia       B-12 1000 MCG Tbdp      Take 1,000 mcg by mouth        cholecalciferol 2000 units tablet      Take 1 tablet by mouth daily.    Vitamin D deficiency       donepezil 10 MG tablet    ARICEPT    90 tablet    TAKE 1 TABLET BY MOUTH AT BEDTIME    Dementia without behavioral disturbance, unspecified dementia type       gabapentin 100 MG capsule    NEURONTIN     Take 1 capsule by mouth 2 times daily        memantine 10 MG tablet    NAMENDA    180 tablet    TAKE 1 TABLET (10 MG) BY MOUTH 2 TIMES DAILY    Dementia without behavioral disturbance, unspecified dementia type       pravastatin 40 MG tablet    PRAVACHOL    90 tablet    Take 1 tablet (40 mg) by mouth daily    Hyperlipidemia LDL goal <130       sertraline 100 MG tablet    ZOLOFT     TAKE 1 & 1/2 TABLETS BY MOUTH ONCE DAILY.

## 2018-07-25 NOTE — LETTER
[facility]  [address] [id number] [phone]      7/25/2018       Micheal Campa  [address]   [facility] [address]        Dear Micheal,    Your hemoglobin is normal.    Your cholesterol looks good and I would not change anything at this point but would repeat your labs in 12 months.    In addition, your PSA or prostate screening antigen is normal and should be repeated annually.    Sincerely,      Christopher Ramirez MD  Internal Medicine

## 2018-08-22 ENCOUNTER — TRANSFERRED RECORDS (OUTPATIENT)
Dept: HEALTH INFORMATION MANAGEMENT | Facility: CLINIC | Age: 73
End: 2018-08-22

## 2018-10-16 ENCOUNTER — TELEPHONE (OUTPATIENT)
Dept: INTERNAL MEDICINE | Facility: CLINIC | Age: 73
End: 2018-10-16

## 2018-10-16 DIAGNOSIS — N39.44 URINARY INCONTINENCE, NOCTURNAL ENURESIS: ICD-10-CM

## 2018-10-16 NOTE — TELEPHONE ENCOUNTER
Patient's wife seeing me.   Having problems with nocturnal incontinence, and overflow of undergarments.  Willing to try condom catheter.   Ordered.

## 2019-11-03 ENCOUNTER — HEALTH MAINTENANCE LETTER (OUTPATIENT)
Age: 74
End: 2019-11-03

## 2020-02-10 ENCOUNTER — HEALTH MAINTENANCE LETTER (OUTPATIENT)
Age: 75
End: 2020-02-10

## 2020-11-16 ENCOUNTER — HEALTH MAINTENANCE LETTER (OUTPATIENT)
Age: 75
End: 2020-11-16

## 2021-04-03 ENCOUNTER — HEALTH MAINTENANCE LETTER (OUTPATIENT)
Age: 76
End: 2021-04-03

## 2021-06-21 NOTE — PROGRESS NOTES
"MEMORY EVALUATION CLINIC - FOLLOWUP    INTERVAL HISTORY: Mino is here today in follow up of his dementia and is accompanied by his wife Mimi Fuentes. I last saw them in Nov 2017. Mimi Fuentes did fill out previsit questionnaire. There continues to be signs of progression of the disease. He still goes to adult day program at [facility] but potentially that may have to be limited if he gets aggressive. There have now been a few instances of him getting aggressive with sudden \"dark moods\" as Mimi Fuentes puts it. They are lasting longer now up to a few hours without any known trigger. Can be AM or PM. Recently when Mimi Fuentes went out of town and one of their sons stayed with him, there was a time when Mino didn't recognize him and took out a scissors and told him to \"go to your room!\". His son had to step away and it calmed down. Also one time he panicked while Mimi Fuentes was driving and he wanted to suddenly get out of the car and said \"I've gotta get out of here!\". Thankfully the doors were locked and he couldn't get out. Most of the time he is calm and even-keeled but these changes concern Mimi Fuentes. In talking to Mino today he is having a good day. He denies being scared, sad, in pain nor having questions. His speech has gotten quite difficult to follow his train of thoughts. They did go see neurologist Dr. Bazan about this several months ago and Mino was started on Sertraline and slowly increased up to current dose of 150 mg but it doesn't seem to have done anything for him. He also has a behavior of touching things and rubbing his finger together like there is something on them; no tremor per Mimi Fuentes. Also she notes in relation to the dark moods he at times will reference that someone may \"wish them harm\", though its not clear if he has hallucinations of this and sees people or if its a delusion.     He is eating well per Mimi Fuentes though has lost a few pounds. He is having some more difficulty swallowing and at times will " Scheduling outreach call to review Health Maintenance and / or schedule appointment.     AWV done 5-3-2021  Colonoscopy cologuard done 2-  [facility] [address] GAP YES  Lab work   Mammogram   PHQ-9 done  Last appointment 5-3-2021 w/ Keli Gan and f/u in 1 year  Upcoming appointment no  Scanned and updated HM yes    Recent appointment 5-3-2021 AWV and CDC, and follow up in 1 year, left message for patient to contact me at [phone] to schedule an appointment if needed "pocket food or pills in his cheek. He is fully dependent on Trina for all IADLs and in terms of ADLs he needs help finding the bathroom, getting showers and dressed. He is not incontinent during the day but is at night; she prompts him to use the restroom during the day. He wears pull ups to bed and they don't bother him nor does it seem to bother him that he is wet in the AM. He does sleep well through the night. Had UA recently that was negative but Mimi Fuentes requests one more for peace of mind.     She says he can't carry a conversation anymore and is \"confused all the time\" with some \"nonsensical\" talking too.     We discussed his advanced directive which is on file and also feeding tubes and how they are not recommended in end stage dementia. Mimi Fuentes has done some work with [employer] and states she is aware of the progressive unfortunate physical decline he will also have with immobility and progressive loss of function. In talking about this Mino mentions something about liking \"to be aware\" though its hard to know how much he understands.        REVIEW OF SYSTEMS: Detailed as above       OBJECTIVE: /68 (BP Location: Right arm, Cuff Size: Adult Regular)  Pulse 65  Temp 99.1  F (37.3  C) (Oral)  Ht 5' 2.25\" (1.581 m)  Wt 157 lb 3.2 oz (71.3 kg)  SpO2 96%  BMI 28.52 kg/m2  Alert, casually dressed, well cared for appearance  Is relaxed and polite and listens in and tries to participate in conversation too though he is tangential and his stories are hard to follow and are vague  He walks slower now and takes his time  Wt Readings from Last 4 Encounters:   05/14/18 157 lb 3.2 oz (71.3 kg)   03/09/18 161 lb (73 kg)   11/01/17 163 lb 14.4 oz (74.3 kg)   08/30/17 164 lb (74.4 kg)       INVESTIGATIONS: UA today unremarkable for infection        ASSESSMENT/PLAN: Mr. Mino Campa is a pleasant 72 year old male with early onset Alzheimer's disease. Discussed with Mimi Fuentes & Mino and provided " support. Discussed advanced care planning. Also worth a trial to switch to a different SSRI to see if that helps and Mimi Fuentes would like to run that by his neurologist Dr. Bazan since he initiated the Sertraline. For now as is still living in the community would continue Aricept and Namenda. Keep an eye on weight as just had a few pounds of weight loss.    Patient Instructions   Urine sample today    Ok to reduce B12 down from 2,500 mcg daily to 1,000 mcg daily     Alternatives to Sertraline could be: Lexapro (Escitalopram) or Celexa (Citalopram) that you can discuss with Dr. Bazan. If he wants me to take over this transition please let me know.     Follow up in at least 6 months        MDM: 45 minutes spent with patient/wife, over 50% time counseling, coordinating care and explaining about nature of the patient's conditions.    Kady Chavez, DO  Internal Medicine and Geriatrics  [facility]

## 2021-09-18 ENCOUNTER — HEALTH MAINTENANCE LETTER (OUTPATIENT)
Age: 76
End: 2021-09-18

## 2022-04-30 ENCOUNTER — HEALTH MAINTENANCE LETTER (OUTPATIENT)
Age: 77
End: 2022-04-30

## 2022-11-19 ENCOUNTER — HEALTH MAINTENANCE LETTER (OUTPATIENT)
Age: 77
End: 2022-11-19

## 2023-06-01 ENCOUNTER — HEALTH MAINTENANCE LETTER (OUTPATIENT)
Age: 78
End: 2023-06-01